# Patient Record
Sex: FEMALE | Race: BLACK OR AFRICAN AMERICAN | Employment: FULL TIME | URBAN - NONMETROPOLITAN AREA
[De-identification: names, ages, dates, MRNs, and addresses within clinical notes are randomized per-mention and may not be internally consistent; named-entity substitution may affect disease eponyms.]

---

## 2020-10-22 ENCOUNTER — HOSPITAL ENCOUNTER (EMERGENCY)
Age: 45
Discharge: HOME | End: 2020-10-22
Attending: EMERGENCY MEDICINE
Payer: COMMERCIAL

## 2020-10-22 VITALS
TEMPERATURE: 98.4 F | SYSTOLIC BLOOD PRESSURE: 170 MMHG | WEIGHT: 293 LBS | DIASTOLIC BLOOD PRESSURE: 103 MMHG | HEIGHT: 63 IN | BODY MASS INDEX: 51.91 KG/M2 | RESPIRATION RATE: 20 BRPM | HEART RATE: 78 BPM | OXYGEN SATURATION: 99 %

## 2020-10-22 DIAGNOSIS — M75.51 ACUTE BURSITIS OF RIGHT SHOULDER: Primary | ICD-10-CM

## 2020-10-22 PROCEDURE — 74011250636 HC RX REV CODE- 250/636: Performed by: EMERGENCY MEDICINE

## 2020-10-22 PROCEDURE — 99283 EMERGENCY DEPT VISIT LOW MDM: CPT

## 2020-10-22 PROCEDURE — 96372 THER/PROPH/DIAG INJ SC/IM: CPT

## 2020-10-22 PROCEDURE — 74011250637 HC RX REV CODE- 250/637: Performed by: EMERGENCY MEDICINE

## 2020-10-22 RX ORDER — IBUPROFEN 800 MG/1
800 TABLET ORAL
Qty: 20 TAB | Refills: 0 | Status: SHIPPED | OUTPATIENT
Start: 2020-10-22 | End: 2020-10-29

## 2020-10-22 RX ORDER — ACETAMINOPHEN AND CODEINE PHOSPHATE 300; 30 MG/1; MG/1
1 TABLET ORAL
Status: COMPLETED | OUTPATIENT
Start: 2020-10-22 | End: 2020-10-22

## 2020-10-22 RX ORDER — KETOROLAC TROMETHAMINE 30 MG/ML
60 INJECTION, SOLUTION INTRAMUSCULAR; INTRAVENOUS
Status: COMPLETED | OUTPATIENT
Start: 2020-10-22 | End: 2020-10-22

## 2020-10-22 RX ORDER — ACETAMINOPHEN AND CODEINE PHOSPHATE 300; 30 MG/1; MG/1
1 TABLET ORAL
Qty: 18 TAB | Refills: 0 | Status: SHIPPED | OUTPATIENT
Start: 2020-10-22 | End: 2020-10-25

## 2020-10-22 RX ADMIN — ACETAMINOPHEN AND CODEINE PHOSPHATE 1 TABLET: 300; 30 TABLET ORAL at 01:42

## 2020-10-22 RX ADMIN — KETOROLAC TROMETHAMINE 60 MG: 30 INJECTION, SOLUTION INTRAMUSCULAR at 01:42

## 2020-10-22 NOTE — ED PROVIDER NOTES
Patient presents with complaint of right shoulder pain for 1 month. Worse with movement or palpation. No recent trauma. Duration: 1 month    Intensity: severe    Modified by: palpation or movement               No past medical history on file. No past surgical history on file. No family history on file. Social History     Socioeconomic History    Marital status: Not on file     Spouse name: Not on file    Number of children: Not on file    Years of education: Not on file    Highest education level: Not on file   Occupational History    Not on file   Social Needs    Financial resource strain: Not on file    Food insecurity     Worry: Not on file     Inability: Not on file    Transportation needs     Medical: Not on file     Non-medical: Not on file   Tobacco Use    Smoking status: Not on file   Substance and Sexual Activity    Alcohol use: Not on file    Drug use: Not on file    Sexual activity: Not on file   Lifestyle    Physical activity     Days per week: Not on file     Minutes per session: Not on file    Stress: Not on file   Relationships    Social connections     Talks on phone: Not on file     Gets together: Not on file     Attends L[employer]-day service: Not on file     Active member of club or organization: Not on file     Attends meetings of clubs or organizations: Not on file     Relationship status: Not on file    Intimate partner violence     Fear of current or ex partner: Not on file     Emotionally abused: Not on file     Physically abused: Not on file     Forced sexual activity: Not on file   Other Topics Concern    Not on file   Social History Narrative    Not on file         ALLERGIES: Patient has no allergy information on record. Review of Systems   Constitutional: Negative. HENT: Negative. Eyes: Negative. Respiratory: Negative. Cardiovascular: Negative. Gastrointestinal: Negative. Endocrine: Negative. Genitourinary: Negative.     Musculoskeletal: + right shoulder pain   Skin: Negative. Allergic/Immunologic: Negative. Neurological: Negative. Hematological: Negative. Psychiatric/Behavioral: Negative. All other systems reviewed and are negative. There were no vitals filed for this visit. Physical Exam  Vitals signs and nursing note reviewed. Constitutional:       Appearance: She is well-developed. She is obese. HENT:      Head: Normocephalic and atraumatic. Nose: Nose normal.      Mouth/Throat:      Mouth: Mucous membranes are moist.   Eyes:      Conjunctiva/sclera: Conjunctivae normal.      Pupils: Pupils are equal, round, and reactive to light. Neck:      Musculoskeletal: Normal range of motion and neck supple. Cardiovascular:      Rate and Rhythm: Normal rate and regular rhythm. Heart sounds: Normal heart sounds. Pulmonary:      Breath sounds: Normal breath sounds. Abdominal:      General: Bowel sounds are normal.      Palpations: Abdomen is soft. Musculoskeletal:      Right shoulder: She exhibits decreased range of motion and tenderness. She exhibits no swelling and no effusion. Arms:    Neurological:      General: No focal deficit present. Mental Status: She is alert.    Psychiatric:         Mood and Affect: Mood normal.         Behavior: Behavior normal.          MDM  Number of Diagnoses or Management Options  Risk of Complications, Morbidity, and/or Mortality  Presenting problems: low  Diagnostic procedures: low    Patient Progress  Patient progress: improved         Procedures

## 2020-11-04 ENCOUNTER — OFFICE VISIT (OUTPATIENT)
Dept: ORTHOPEDIC SURGERY | Age: 45
End: 2020-11-04
Payer: COMMERCIAL

## 2020-11-04 VITALS — BODY MASS INDEX: 51.91 KG/M2 | WEIGHT: 293 LBS | HEIGHT: 63 IN

## 2020-11-04 DIAGNOSIS — M75.51 BURSITIS OF RIGHT SHOULDER: ICD-10-CM

## 2020-11-04 DIAGNOSIS — M25.511 RIGHT SHOULDER PAIN, UNSPECIFIED CHRONICITY: Primary | ICD-10-CM

## 2020-11-04 DIAGNOSIS — E66.01 OBESITY, MORBID (HCC): ICD-10-CM

## 2020-11-04 PROCEDURE — 99203 OFFICE O/P NEW LOW 30 MIN: CPT | Performed by: ORTHOPAEDIC SURGERY

## 2020-11-04 PROCEDURE — 20611 DRAIN/INJ JOINT/BURSA W/US: CPT | Performed by: ORTHOPAEDIC SURGERY

## 2020-11-04 RX ORDER — AMLODIPINE BESYLATE 10 MG/1
TABLET ORAL DAILY
COMMUNITY

## 2020-11-04 RX ORDER — TRIAMCINOLONE ACETONIDE 40 MG/ML
40 INJECTION, SUSPENSION INTRA-ARTICULAR; INTRAMUSCULAR ONCE
Status: COMPLETED | OUTPATIENT
Start: 2020-11-04 | End: 2020-11-04

## 2020-11-04 RX ORDER — LIDOCAINE HYDROCHLORIDE 10 MG/ML
9 INJECTION INFILTRATION; PERINEURAL ONCE
Status: COMPLETED | OUTPATIENT
Start: 2020-11-04 | End: 2020-11-04

## 2020-11-04 RX ADMIN — TRIAMCINOLONE ACETONIDE 40 MG: 40 INJECTION, SUSPENSION INTRA-ARTICULAR; INTRAMUSCULAR at 16:32

## 2020-11-04 RX ADMIN — LIDOCAINE HYDROCHLORIDE 9 ML: 10 INJECTION INFILTRATION; PERINEURAL at 16:32

## 2020-11-04 NOTE — PROGRESS NOTES
Name: Angie Farrell    : 1975     Service Dept: [address] [place] [address] and Sports Medicine    Patient's Pharmacies:    [address] [facility], [address] [facility]  [address] [address]  Phone: [phone] Fax: [phone]       Chief Complaint   Patient presents with    Shoulder Pain        Visit Vitals  Ht 5' 3\" (1.6 m)   Wt 323 lb (146.5 kg)   BMI 57.22 kg/m²        Allergies   Allergen Reactions    Bactrim [Sulfamethoprim] Hives    Penicillins Hives    Sulfa (Sulfonamide Antibiotics) Hives    Zithromax [Azithromycin] Hives        Current Outpatient Medications   Medication Sig Dispense Refill    amLODIPine (NORVASC) 10 mg tablet Take  by mouth daily.  aspirin-acetaminophen-caffeine (EXCEDRIN ES) 250-250-65 mg per tablet Take 1 Tab by mouth. Current Facility-Administered Medications   Medication Dose Route Frequency Provider Last Rate Last Dose    lidocaine (XYLOCAINE) 10 mg/mL (1 %) injection 9 mL  9 mL Other ONCE Chico Parks MD        triamcinolone acetonide (KENALOG-40) 40 mg/mL injection 40 mg  40 mg Intra artICUlar ONCE Chico Parks MD            There is no problem list on file for this patient.        Family History   Problem Relation Age of Onset   Comitia[employer]Prophet Cancer Mother     Hypertension Mother     Diabetes Mother     No Known Problems Brother     Cancer Maternal Aunt     Lupus Maternal Grandmother     Arthritis-osteo Maternal Grandmother     Cancer Maternal Grandmother     Diabetes Maternal Grandfather     Hypertension Maternal Grandfather         Social History     Socioeconomic History    Marital status:      Spouse name: Not on file    Number of children: Not on file    Years of education: Not on file    Highest education level: Not on file   Tobacco Use    Smoking status: Never Smoker    Smokeless tobacco: Never Used   Substance and Sexual Activity    Alcohol use: Yes     Comment: Social drinking 6 days a month  Drug use: Never    Sexual activity: Yes        Past Surgical History:   Procedure Laterality Date    ENDOMETRIAL CRYOABLATION Bilateral 2014    HX CHOLECYSTECTOMY      HX GYN          Past Medical History:   Diagnosis Date    Hypertension     Migraine         I have reviewed and agree with [address] and ROS and intake form in chart and the record. Review of Systems:   Patient is a pleasant appearing individual, appropriately dressed, well hydrated, well nourished, who is alert, appropriately oriented for age, and in no acute distress with a normal gait and normal affect who does not appear to be in any significant pain. Physical Exam:  Right Shoulder - Grossly neurovascularly intact. Range of motion-Full passive, Active with impingement. No Point tenderness, Strength-weakness with abduction, some mild crepitation, No skin lesion are identified, No instabilty is noted, No apprehension. No Swelling. Left Shoulder - Grossly neurovascularly intact, Full Range of motion, No point tenderness, No weakness, No skin lesions, No Instability, No apprehension, No swelling. Procedure Documentation:    Please note that [employer] ultrasound was used to perform an ultrasound guided injection into the right shoulder. A pre-injection ultrasound was taken of right shoulder. After the needle was placed into the Posterior portal(s) and while the kenelog was injected another ultrasound picture confirmed the appropriate placement. The site of injection, right shoulder, was sterilely prepped. The injection of 40 mg Kenalog and Lidocaine was administered appropriately in right shoulder and the patient tolerated it well. No site reaction was identified. Appropriate dressing was placed. Consent was obtained for the injection. Encounter Diagnoses     ICD-10-CM ICD-9-CM   1. Right shoulder pain, unspecified chronicity  M25.511 719.41   2.  Bursitis of right shoulder  M75.51 726.10          HPI:  The patient is here with a chief complaint of right shoulder pain. She has been having it for a while now, progressively getting worse. Throbbing pain. It has been the same. Tylenol 3 has helped. Movement makes it worse. Pain is 6/10. ROS:  10-point review of systems is positive for nighttime pain. X-rays of the right shoulder are unremarkable done in the [facility] office. Assessment/Plan:  1. Right shoulder bursitis. Plan will be for cortisone injection. We will see the patient back in 1 week and continue antiinflammatories in the meantime and go from there. Return to Office: Follow-up and Dispositions    · Return in about 1 week (around 11/11/2020). Scribed by Freedom Sullivan as dictated by Nadya Perez. Ct Arauz MD.    Documentation True and Accepted Chico Arauz MD

## 2020-11-04 NOTE — LETTER
Angie Farrell 1975  
[id number]  
 
 
11/4/2020 I hereby authorize and direct Chico Sinclair MD, Zoran Pack, and whomever he may designate as his associate to perform upon myself the following procedure: 
 
Injection of: Kenalog, Supartz, Euflexxa, Orthovisc in the Right/Left ____________________. If any unforeseen condition arises in the course of the procedure, I further authorize him and his associated and/or assistant(s) to do whatever he/she deems advisable. The nature, purpose, benefits, risks, side effects, likelihood of achieving goals, and potential problems that might occur during recuperation, risks for not receiving the proposed care, treatment and services and alternatives of the procedure have been fully explained to me by my physician including, but not limited to: 
 
Swelling, joint pain, skin pigment changes, worsening of condition, and failure to improve. I acknowledge that no guarantee or assurance has been made to me as to the results that may be obtained or the likelihood of success. _______________________________________ Signature of patient or authorized representative [employer] and Sports Medicine fax: [phone]

## 2020-11-04 NOTE — PATIENT INSTRUCTIONS
Shoulder Pain: Care Instructions  Your Care Instructions     You can hurt your shoulder by using it too much during an activity, such as fishing or baseball. It can also happen as part of the everyday wear and tear of getting older. Shoulder injuries can be slow to heal, but your shoulder should get better with time. Your doctor may recommend a sling to rest your shoulder. If you have injured your shoulder, you may need testing and treatment. Follow-up care is a key part of your treatment and safety. Be sure to make and go to all appointments, and call your doctor if you are having problems. It's also a good idea to know your test results and keep a list of the medicines you take. How can you care for yourself at home? · Take pain medicines exactly as directed. ? If the doctor gave you a prescription medicine for pain, take it as prescribed. ? If you are not taking a prescription pain medicine, ask your doctor if you can take an over-the-counter medicine. ? Do not take two or more pain medicines at the same time unless the doctor told you to. Many pain medicines contain acetaminophen, which is Tylenol. Too much acetaminophen (Tylenol) can be harmful. · If your doctor recommends that you wear a sling, use it as directed. Do not take it off before your doctor tells you to. · Put ice or a cold pack on the sore area for 10 to 20 minutes at a time. Put a thin cloth between the ice and your skin. · If there is no swelling, you can put moist heat, a heating pad, or a warm cloth on your shoulder. Some doctors suggest alternating between hot and cold. · Rest your shoulder for a few days. If your doctor recommends it, you can then begin gentle exercise of the shoulder, but do not lift anything heavy. When should you call for help? Call 911 anytime you think you may need emergency care. For example, call if:    · You have chest pain or pressure. This may occur with:  ? Sweating. ?  Shortness of breath. ? Nausea or vomiting. ? Pain that spreads from the chest to the neck, jaw, or one or both shoulders or arms. ? Dizziness or lightheadedness. ? A fast or uneven pulse. After calling 911, chew 1 adult-strength aspirin. Wait for an ambulance. Do not try to drive yourself.     · Your arm or hand is cool or pale or changes color. Call your doctor now or seek immediate medical care if:    · You have signs of infection, such as:  ? Increased pain, swelling, warmth, or redness in your shoulder. ? Red streaks leading from a place on your shoulder. ? Pus draining from an area of your shoulder. ? Swollen lymph nodes in your neck, armpits, or groin. ? A fever. Watch closely for changes in your health, and be sure to contact your doctor if:    · You cannot use your shoulder.     · Your shoulder does not get better as expected. Where can you learn more? Go to http://www.israel.com/  Enter H996 in the search box to learn more about \"Shoulder Pain: Care Instructions. \"  Current as of: March 2, 2020               Content Version: 12.6  © 8330-0120 [employer]. Care instructions adapted under license by [employer] (which disclaims liability or warranty for this information). If you have questions about a medical condition or this instruction, always ask your healthcare professional. [place] [address] any warranty or liability for your use of this information.

## 2020-11-05 PROBLEM — E66.01 OBESITY, MORBID (HCC): Status: ACTIVE | Noted: 2020-11-05

## 2020-11-11 ENCOUNTER — OFFICE VISIT (OUTPATIENT)
Dept: ORTHOPEDIC SURGERY | Age: 45
End: 2020-11-11
Payer: COMMERCIAL

## 2020-11-11 VITALS — WEIGHT: 293 LBS | BODY MASS INDEX: 51.91 KG/M2 | HEIGHT: 63 IN

## 2020-11-11 DIAGNOSIS — M17.11 PRIMARY OSTEOARTHRITIS OF RIGHT KNEE: Primary | ICD-10-CM

## 2020-11-11 DIAGNOSIS — M25.511 RIGHT SHOULDER PAIN, UNSPECIFIED CHRONICITY: ICD-10-CM

## 2020-11-11 PROCEDURE — 99214 OFFICE O/P EST MOD 30 MIN: CPT | Performed by: ORTHOPAEDIC SURGERY

## 2020-11-11 RX ORDER — VALSARTAN AND HYDROCHLOROTHIAZIDE 160; 12.5 MG/1; MG/1
TABLET, FILM COATED ORAL
COMMUNITY
Start: 2020-11-03 | End: 2022-02-19

## 2020-11-11 NOTE — PATIENT INSTRUCTIONS
Shoulder Pain: Care Instructions Your Care Instructions You can hurt your shoulder by using it too much during an activity, such as fishing or baseball. It can also happen as part of the everyday wear and tear of getting older. Shoulder injuries can be slow to heal, but your shoulder should get better with time. Your doctor may recommend a sling to rest your shoulder. If you have injured your shoulder, you may need testing and treatment. Follow-up care is a key part of your treatment and safety. Be sure to make and go to all appointments, and call your doctor if you are having problems. It's also a good idea to know your test results and keep a list of the medicines you take. How can you care for yourself at home? · Take pain medicines exactly as directed. ? If the doctor gave you a prescription medicine for pain, take it as prescribed. ? If you are not taking a prescription pain medicine, ask your doctor if you can take an over-the-counter medicine. ? Do not take two or more pain medicines at the same time unless the doctor told you to. Many pain medicines contain acetaminophen, which is Tylenol. Too much acetaminophen (Tylenol) can be harmful. · If your doctor recommends that you wear a sling, use it as directed. Do not take it off before your doctor tells you to. · Put ice or a cold pack on the sore area for 10 to 20 minutes at a time. Put a thin cloth between the ice and your skin. · If there is no swelling, you can put moist heat, a heating pad, or a warm cloth on your shoulder. Some doctors suggest alternating between hot and cold. · Rest your shoulder for a few days. If your doctor recommends it, you can then begin gentle exercise of the shoulder, but do not lift anything heavy. When should you call for help? Call 911 anytime you think you may need emergency care. For example, call if: 
  · You have chest pain or pressure. This may occur with: ? Sweating. ? Shortness of breath. ? Nausea or vomiting. ? Pain that spreads from the chest to the neck, jaw, or one or both shoulders or arms. ? Dizziness or lightheadedness. ? A fast or uneven pulse. After calling 911, chew 1 adult-strength aspirin. Wait for an ambulance. Do not try to drive yourself.  
  · Your arm or hand is cool or pale or changes color. Call your doctor now or seek immediate medical care if: 
  · You have signs of infection, such as: 
? Increased pain, swelling, warmth, or redness in your shoulder. ? Red streaks leading from a place on your shoulder. ? Pus draining from an area of your shoulder. ? Swollen lymph nodes in your neck, armpits, or groin. ? A fever. Watch closely for changes in your health, and be sure to contact your doctor if: 
  · You cannot use your shoulder.  
  · Your shoulder does not get better as expected. Where can you learn more? Go to http://www.israel.com/ Enter U708 in the search box to learn more about \"Shoulder Pain: Care Instructions. \" Current as of: March 2, 2020               Content Version: 12.6 © 8547-4343 [employer]. Care instructions adapted under license by [employer] (which disclaims liability or warranty for this information). If you have questions about a medical condition or this instruction, always ask your healthcare professional. [place] [address] any warranty or liability for your use of this information.

## 2020-11-11 NOTE — PROGRESS NOTES
Name: Isauro Ballard    : 1975     Service Dept: [address] [place] [address] and Sports Medicine    Patient's Pharmacies:    [address] [place] [address] [facility], [address] [address]  Phone: [phone] Fax: [phone]       Chief Complaint   Patient presents with    Shoulder Pain        Visit Vitals  Ht 5' 3\" (1.6 m)   Wt 323 lb (146.5 kg)   BMI 57.22 kg/m²        Allergies   Allergen Reactions    Bactrim [Sulfamethoprim] Hives    Penicillins Hives    Sulfa (Sulfonamide Antibiotics) Hives    Zithromax [Azithromycin] Hives        Current Outpatient Medications   Medication Sig Dispense Refill    valsartan-hydroCHLOROthiazide (DIOVAN-HCT) 160-12.5 mg per tablet TAKE 1 TABLET BY MOUTH ONCE DAILY      amLODIPine (NORVASC) 10 mg tablet Take  by mouth daily.  aspirin-acetaminophen-caffeine (EXCEDRIN ES) 250-250-65 mg per tablet Take 1 Tab by mouth.           Patient Active Problem List   Diagnosis Code    Obesity, morbid ([facility] [address] E66.01        Family History   Problem Relation Age of Onset    Cancer Mother     Hypertension Mother     Diabetes Mother     No Known Problems Brother     Cancer Maternal Aunt     Lupus Maternal Grandmother     Arthritis-osteo Maternal Grandmother     Cancer Maternal Grandmother     Diabetes Maternal Grandfather     Hypertension Maternal Grandfather         Social History     Socioeconomic History    Marital status:      Spouse name: Not on file    Number of children: Not on file    Years of education: Not on file    Highest education level: Not on file   Tobacco Use    Smoking status: Never Smoker    Smokeless tobacco: Never Used   Substance and Sexual Activity    Alcohol use: Yes     Comment: Social drinking 6 days a month    Drug use: Never    Sexual activity: Yes        Past Surgical History:   Procedure Laterality Date    ENDOMETRIAL CRYOABLATION Bilateral 2014    HX CHOLECYSTECTOMY      HX GYN Past Medical History:   Diagnosis Date    Hypertension     Migraine         I have reviewed and agree with [address] [place] [address] Nw and ROS and intake form in chart and the record. Review of Systems:   Patient is a pleasant appearing individual, appropriately dressed, well hydrated, well nourished, who is alert, appropriately oriented for age, and in no acute distress with a normal gait and normal affect who does not appear to be in any significant pain. Physical Exam:  Right Shoulder - Positive \"empty can\" test, Grossly neurovascularly intact. Range of motion-Full passive, Active with impingement. No Point tenderness, Strength-significant weakness noted with abduction, some mild crepitation, No skin lesion are identified, No instabilty is noted, No apprehension. No Swelling. Left Shoulder - grossly neurovascularly intact. Full Range of motion, No Weakness with abduction, No Point Tenderness, No skin lesion are identified, No instabilty is noted, No apprehension. No cuts or abrasions are identified. Encounter Diagnoses     ICD-10-CM ICD-9-CM   1. Primary osteoarthritis of right knee  M17.11 715.16   2. Right shoulder pain, unspecified chronicity  M25.511 719.41          HPI:  The patient is here with a chief complaint of right shoulder pain, sharp, throbbing pain, post cortisone injection. About 2 months ago, we gave her cortisone injection. It is still the same. Antiinflammatories have not helped either. Using it makes it worse. Pain is 7/10. ROS:  10-point review of systems is unremarkable. Assessment/Plan:  1. Right shoulder possible rotator cuff pathology with difficulty raising her arm. I would like to go and get an MRI to rule out rotator cuff tear and go from there. Return to Office: Follow-up and Dispositions    · Return for POST MRI. Scribed by Mary Junior as dictated by Adi Hernandez. Alexis Snyder MD.    Documentation True and Accepted Chico Snyder, MD

## 2020-11-17 ENCOUNTER — HOSPITAL ENCOUNTER (OUTPATIENT)
Dept: MRI IMAGING | Age: 45
Discharge: HOME | End: 2020-11-17
Attending: ORTHOPAEDIC SURGERY
Payer: COMMERCIAL

## 2020-11-17 DIAGNOSIS — M25.511 RIGHT SHOULDER PAIN, UNSPECIFIED CHRONICITY: ICD-10-CM

## 2020-11-17 PROCEDURE — 73221 MRI JOINT UPR EXTREM W/O DYE: CPT

## 2020-12-02 ENCOUNTER — OFFICE VISIT (OUTPATIENT)
Dept: ORTHOPEDIC SURGERY | Age: 45
End: 2020-12-02
Payer: COMMERCIAL

## 2020-12-02 DIAGNOSIS — M75.111 INCOMPLETE TEAR OF RIGHT ROTATOR CUFF, UNSPECIFIED WHETHER TRAUMATIC: Primary | ICD-10-CM

## 2020-12-02 PROCEDURE — 99213 OFFICE O/P EST LOW 20 MIN: CPT | Performed by: ORTHOPAEDIC SURGERY

## 2020-12-02 NOTE — PATIENT INSTRUCTIONS
Shoulder Pain: Care Instructions  Your Care Instructions     You can hurt your shoulder by using it too much during an activity, such as fishing or baseball. It can also happen as part of the everyday wear and tear of getting older. Shoulder injuries can be slow to heal, but your shoulder should get better with time. Your doctor may recommend a sling to rest your shoulder. If you have injured your shoulder, you may need testing and treatment. Follow-up care is a key part of your treatment and safety. Be sure to make and go to all appointments, and call your doctor if you are having problems. It's also a good idea to know your test results and keep a list of the medicines you take. How can you care for yourself at home? · Take pain medicines exactly as directed. ? If the doctor gave you a prescription medicine for pain, take it as prescribed. ? If you are not taking a prescription pain medicine, ask your doctor if you can take an over-the-counter medicine. ? Do not take two or more pain medicines at the same time unless the doctor told you to. Many pain medicines contain acetaminophen, which is Tylenol. Too much acetaminophen (Tylenol) can be harmful. · If your doctor recommends that you wear a sling, use it as directed. Do not take it off before your doctor tells you to. · Put ice or a cold pack on the sore area for 10 to 20 minutes at a time. Put a thin cloth between the ice and your skin. · If there is no swelling, you can put moist heat, a heating pad, or a warm cloth on your shoulder. Some doctors suggest alternating between hot and cold. · Rest your shoulder for a few days. If your doctor recommends it, you can then begin gentle exercise of the shoulder, but do not lift anything heavy. When should you call for help? Call 911 anytime you think you may need emergency care. For example, call if:    · You have chest pain or pressure. This may occur with:  ? Sweating. ?  Shortness of breath. ? Nausea or vomiting. ? Pain that spreads from the chest to the neck, jaw, or one or both shoulders or arms. ? Dizziness or lightheadedness. ? A fast or uneven pulse. After calling 911, chew 1 adult-strength aspirin. Wait for an ambulance. Do not try to drive yourself.     · Your arm or hand is cool or pale or changes color. Call your doctor now or seek immediate medical care if:    · You have signs of infection, such as:  ? Increased pain, swelling, warmth, or redness in your shoulder. ? Red streaks leading from a place on your shoulder. ? Pus draining from an area of your shoulder. ? Swollen lymph nodes in your neck, armpits, or groin. ? A fever. Watch closely for changes in your health, and be sure to contact your doctor if:    · You cannot use your shoulder.     · Your shoulder does not get better as expected. Where can you learn more? Go to http://www.israel.com/  Enter H996 in the search box to learn more about \"Shoulder Pain: Care Instructions. \"  Current as of: March 2, 2020               Content Version: 12.6  © 2316-6310 [employer]. Care instructions adapted under license by [employer] (which disclaims liability or warranty for this information). If you have questions about a medical condition or this instruction, always ask your healthcare professional. [place] [address] any warranty or liability for your use of this information.

## 2020-12-02 NOTE — PROGRESS NOTES
Name: Dawood Sifuentes    : 1975     Service Dept: [address] [place] [address] and Sports Medicine    Patient's Pharmacies:    [address] [address] [facility], [address] [address] [place] [id number]  Phone: [phone] Fax: [phone]       Chief Complaint   Patient presents with    Shoulder Pain        There were no vitals taken for this visit. Allergies   Allergen Reactions    Bactrim [Sulfamethoprim] Hives    Penicillins Hives    Sulfa (Sulfonamide Antibiotics) Hives    Zithromax [Azithromycin] Hives        Current Outpatient Medications   Medication Sig Dispense Refill    valsartan-hydroCHLOROthiazide (DIOVAN-HCT) 160-12.5 mg per tablet TAKE 1 TABLET BY MOUTH ONCE DAILY      amLODIPine (NORVASC) 10 mg tablet Take  by mouth daily.  aspirin-acetaminophen-caffeine (EXCEDRIN ES) 250-250-65 mg per tablet Take 1 Tab by mouth.           Patient Active Problem List   Diagnosis Code    Ob[facility], morbid ([facility] E66.01        Family History   Problem Relation Age of Onset    Cancer Mother     Hypertension Mother     Diabetes Mother     No Known Problems Brother     Cancer Maternal Aunt     Lupus Maternal Grandmother     Arthritis-osteo Maternal Grandmother     Cancer Maternal Grandmother     Diabetes Maternal Grandfather     Hypertension Maternal Grandfather         Social History     Socioeconomic History    Marital status:      Spouse name: Not on file    Number of children: Not on file    Years of education: Not on file    Highest education level: Not on file   Tobacco Use    Smoking status: Never Smoker    Smokeless tobacco: Never Used   Substance and Sexual Activity    Alcohol use: Yes     Comment: Social drinking 6 days a month    Drug use: Never    Sexual activity: Yes        Past Surgical History:   Procedure Laterality Date    ENDOMETRIAL CRYOABLATION Bilateral 2014    HX CHOLECYSTECTOMY      HX GYN          Past Medical History: Diagnosis Date    Hypertension     Migraine         I have reviewed and agree with PFSH and ROS and intake form in chart and the record. Review of Systems:   Patient is a pleasant appearing individual, appropriately dressed, well hydrated, well nourished, who is alert, appropriately oriented for age, and in no acute distress with a normal gait and normal affect who does not appear to be in any significant pain. Physical Exam:  Right Shoulder - Grossly neurovascularly intact. Range of motion-Full passive, Active with impingement. No Point tenderness, Strength-weakness with abduction, some mild crepitation, No skin lesion are identified, No instabilty is noted, No apprehension. No Swelling. Left Shoulder - Grossly neurovascularly intact, Full Range of motion, No point tenderness, No weakness, No skin lesions, No Instability, No apprehension, No swelling. Encounter Diagnoses     ICD-10-CM ICD-9-CM   1. Incomplete tear of right rotator cuff, unspecified whether traumatic  M75.111 840.4       HPI:  The patient is here with a chief complaint of right shoulder pain, post-MRI which shows she has got a partial rotator cuff tear. Pain is 6/10. ROS:  10-point review of systems is positive for nighttime pain. Assessment/Plan:  1. Right shoulder rotator cuff partial tear that is getting better, not completely resolved. I do not recommend any surgical intervention. Plan would be for physical therapy. We will see her back in 4 to 6 weeks, and she has also already tried cortisone injection with some minor relief. Return to Office: Follow-up and Dispositions    · Return in about 6 weeks (around 1/13/2021). Scribed by Jaimee Spence as dictated by Christopher Wallace. Jay Mcnair MD.  Documentation True and Accepted Chico Mcnair MD

## 2021-07-27 ENCOUNTER — HOSPITAL ENCOUNTER (EMERGENCY)
Age: 46
Discharge: HOME | End: 2021-07-27
Attending: EMERGENCY MEDICINE
Payer: COMMERCIAL

## 2021-07-27 VITALS
OXYGEN SATURATION: 98 % | WEIGHT: 293 LBS | TEMPERATURE: 98 F | DIASTOLIC BLOOD PRESSURE: 80 MMHG | SYSTOLIC BLOOD PRESSURE: 141 MMHG | HEART RATE: 93 BPM | HEIGHT: 63 IN | BODY MASS INDEX: 51.91 KG/M2 | RESPIRATION RATE: 20 BRPM

## 2021-07-27 DIAGNOSIS — T14.8XXA MUSCLE STRAIN: Primary | ICD-10-CM

## 2021-07-27 DIAGNOSIS — M54.6 ACUTE RIGHT-SIDED THORACIC BACK PAIN: ICD-10-CM

## 2021-07-27 PROCEDURE — 99283 EMERGENCY DEPT VISIT LOW MDM: CPT

## 2021-07-27 PROCEDURE — 74011250637 HC RX REV CODE- 250/637: Performed by: EMERGENCY MEDICINE

## 2021-07-27 RX ORDER — CYCLOBENZAPRINE HCL 10 MG
10 TABLET ORAL
Qty: 20 TABLET | Refills: 0 | Status: SHIPPED | OUTPATIENT
Start: 2021-07-27 | End: 2022-02-19

## 2021-07-27 RX ORDER — IBUPROFEN 800 MG/1
800 TABLET ORAL
Qty: 20 TABLET | Refills: 0 | Status: SHIPPED | OUTPATIENT
Start: 2021-07-27 | End: 2021-08-03

## 2021-07-27 RX ORDER — IBUPROFEN 800 MG/1
800 TABLET ORAL ONCE
Status: COMPLETED | OUTPATIENT
Start: 2021-07-27 | End: 2021-07-27

## 2021-07-27 RX ADMIN — IBUPROFEN 800 MG: 800 TABLET, FILM COATED ORAL at 19:41

## 2021-07-27 NOTE — ED PROVIDER NOTES
EMERGENCY DEPARTMENT HISTORY AND PHYSICAL EXAM      Date: 7/27/2021  Patient Name: Adelaide Moser    History of Presenting Illness     Chief Complaint   Patient presents with    Back Pain     pt presents with generalized back pain states that it has been ongoing for 3 days. Pt states she strained her back at work. Pt ambulatory to triage, pt rates pain 8/10. Pt stats that she has taken OTC medications w/o relief       History Provided By: Patient    HPI: Adelaide Moser, 39 y.o. female with a past medical history significant hypertension presents to the ED with cc of right thoracic back pain injured 3 days ago gradually worsening. No shortness of breath falls previous history of back pain    There are no other complaints, changes, or physical findings at this time. PCP: Chevy Gonzales MD    No current facility-administered medications on file prior to encounter. Current Outpatient Medications on File Prior to Encounter   Medication Sig Dispense Refill    oxyCODONE-acetaminophen (PERCOCET) 5-325 mg per tablet TAKE 1 2 TO 1 (ONE HALF TO ONE) TABLET BY MOUTH EVERY 6 HOURS AS NEEDED FOR SEVERE PAIN      valsartan-hydroCHLOROthiazide (DIOVAN-HCT) 160-12.5 mg per tablet TAKE 1 TABLET BY MOUTH ONCE DAILY      amLODIPine (NORVASC) 10 mg tablet Take  by mouth daily.  aspirin-acetaminophen-caffeine (EXCEDRIN ES) 250-250-65 mg per tablet Take 1 Tab by mouth.          Past History     Past Medical History:  Past Medical History:   Diagnosis Date    Hypertension     Migraine        Past Surgical History:  Past Surgical History:   Procedure Laterality Date    ENDOMETRIAL CRYOABLATION Bilateral 2014    HX CHOLECYSTECTOMY      HX GYN         Family History:  Family History   Problem Relation Age of Onset    Cancer Mother     Hypertension Mother     Diabetes Mother     No Known Problems Brother     Cancer Maternal Aunt     Lupus Maternal Grandmother     Arthritis-osteo Maternal Grandmother     Cancer Maternal Grandmother     Diabetes Maternal Grandfather     Hypertension Maternal Grandfather        Social History:  Social History     Tobacco Use    Smoking status: Never Smoker    Smokeless tobacco: Never Used   Substance Use Topics    Alcohol use: Yes     Comment: Social drinking 6 days a month    Drug use: Never       Allergies: Allergies   Allergen Reactions    Bactrim [Sulfamethoprim] Hives    Penicillins Hives    Sulfa (Sulfonamide Antibiotics) Hives    Zithromax [Azithromycin] Hives         Review of Systems   Review of all other systems negative  Review of Systems    Physical Exam   Is a black female in moderate pain  Physical Exam  Vitals and nursing note reviewed. Constitutional:       General: She is not in acute distress. Appearance: Normal appearance. She is obese. She is not ill-appearing, toxic-appearing or diaphoretic. HENT:      Head: Normocephalic and atraumatic. Nose: Nose normal.      Mouth/Throat:      Mouth: Mucous membranes are moist.   Eyes:      Extraocular Movements: Extraocular movements intact. Conjunctiva/sclera: Conjunctivae normal.      Pupils: Pupils are equal, round, and reactive to light. Cardiovascular:      Rate and Rhythm: Normal rate and regular rhythm. Pulses: Normal pulses. Heart sounds: Normal heart sounds. No murmur heard. Pulmonary:      Effort: Pulmonary effort is normal. No respiratory distress. Breath sounds: Normal breath sounds. No wheezing, rhonchi or rales. Chest:      Chest wall: No tenderness. Abdominal:      General: Abdomen is flat. Palpations: Abdomen is soft. There is no mass. Tenderness: There is no abdominal tenderness. There is no right CVA tenderness, left CVA tenderness, guarding or rebound. Hernia: No hernia is present. Musculoskeletal:         General: Tenderness present. No deformity or signs of injury.         Arms:       Cervical back: Normal range of motion and neck supple. No rigidity. No muscular tenderness. Right lower leg: No edema. Left lower leg: No edema. Comments: Marked tenderness in the right lateral thoracic back worse with other extremity movement   Skin:     General: Skin is warm. Findings: No erythema or rash. Neurological:      General: No focal deficit present. Mental Status: She is alert and oriented to person, place, and time. Cranial Nerves: No cranial nerve deficit. Sensory: No sensory deficit. Motor: No weakness. Psychiatric:         Mood and Affect: Mood normal.         Behavior: Behavior normal.         Thought Content: Thought content normal.         Lab and Diagnostic Study Results     Labs -   No results found for this or any previous visit (from the past 12 hour(s)). Radiologic Studies -   @lastxrresult@  CT Results  (Last 48 hours)    None        CXR Results  (Last 48 hours)    None            Medical Decision Making   - I am the first provider for this patient. - I reviewed the vital signs, available nursing notes, past medical history, past surgical history, family history and social history. - Initial assessment performed. The patients presenting problems have been discussed, and they are in agreement with the care plan formulated and outlined with them. I have encouraged them to ask questions as they arise throughout their visit. Vital Signs-Reviewed the patient's vital signs.   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 Diagnosis, care plan and treatment options were discussed. The patient understands the instructions and will follow up as directed. The patients results have been reviewed with them. They have been counseled regarding their diagnosis. The patient verbally convey understanding and agreement of the signs, symptoms, diagnosis, treatment and prognosis and additionally agrees to follow up as recommended with their PCP in 24 - 48 hours. They also agree with the care-plan and convey that all of their questions have been answered. I have also put together some discharge instructions for them that include: 1) educational information regarding their diagnosis, 2) how to care for their diagnosis at home, as well a 3) list of reasons why they would want to return to the ED prior to their follow-up appointment, should their condition change. DISCHARGE PLAN:  1. Current Discharge Medication List      CONTINUE these medications which have NOT CHANGED    Details   oxyCODONE-acetaminophen (PERCOCET) 5-325 mg per tablet TAKE 1 2 TO 1 (ONE HALF TO ONE) TABLET BY MOUTH EVERY 6 HOURS AS NEEDED FOR SEVERE PAIN      valsartan-hydroCHLOROthiazide (DIOVAN-HCT) 160-12.5 mg per tablet TAKE 1 TABLET BY MOUTH ONCE DAILY      amLODIPine (NORVASC) 10 mg tablet Take  by mouth daily. aspirin-acetaminophen-caffeine (EXCEDRIN ES) 250-250-65 mg per tablet Take 1 Tab by mouth. 2.   Follow-up Information    None       3. Return to ED if worse   4.    Current Discharge Medication List            Diagnosis     Clinical Impression: Thoracic muscle strain  Attestations:    Marya Zimmer MD    Please note that this dictation was completed with Style[employer], the computer voice recognition software. Quite often unanticipated grammatical, syntax, homophones, and other interpretive errors are inadvertently transcribed by the computer software. Please disregard these errors. Please excuse any errors that have escaped final proofreading. Thank you.

## 2021-07-27 NOTE — LETTER
NOTIFICATION RETURN TO WORK / SCHOOL    7/27/2021 7:34 PM    Ms. Jamse Koyanagi  [place] [address] [place] [id number]      To Whom It May Concern:    Jamse Koyanagi is currently under the care of [facility] EMERGENCY DEPT. She will return to work/school on: July 30, 2021    Jennifer Velazquez may return to work/school with the following restrictions: Limited bending and lifting as tolerated back injury. If there are questions or concerns please have the patient contact our office.         Sincerely,      Marya Zimmer MD

## 2021-07-27 NOTE — ED TRIAGE NOTES
.  Chief Complaint   Patient presents with    Back Pain     pt presents with generalized back pain states that it has been ongoing for 3 days. Pt states she strained her back at work. Pt ambulatory to triage, pt rates pain 8/10.  Pt stats that she has taken OTC medications w/o relief

## 2021-09-02 ENCOUNTER — HOSPITAL ENCOUNTER (EMERGENCY)
Age: 46
Discharge: HOME | End: 2021-09-02
Attending: EMERGENCY MEDICINE
Payer: COMMERCIAL

## 2021-09-02 VITALS
SYSTOLIC BLOOD PRESSURE: 141 MMHG | BODY MASS INDEX: 51.91 KG/M2 | HEIGHT: 63 IN | OXYGEN SATURATION: 98 % | RESPIRATION RATE: 20 BRPM | WEIGHT: 293 LBS | DIASTOLIC BLOOD PRESSURE: 94 MMHG | TEMPERATURE: 98.4 F | HEART RATE: 88 BPM

## 2021-09-02 DIAGNOSIS — S39.012A BACK STRAIN, INITIAL ENCOUNTER: Primary | ICD-10-CM

## 2021-09-02 PROCEDURE — 74011250637 HC RX REV CODE- 250/637: Performed by: EMERGENCY MEDICINE

## 2021-09-02 PROCEDURE — 99283 EMERGENCY DEPT VISIT LOW MDM: CPT

## 2021-09-02 RX ORDER — NAPROXEN 375 MG/1
375 TABLET ORAL
Status: COMPLETED | OUTPATIENT
Start: 2021-09-02 | End: 2021-09-02

## 2021-09-02 RX ORDER — METHOCARBAMOL 750 MG/1
750 TABLET, FILM COATED ORAL 4 TIMES DAILY
Qty: 20 TABLET | Refills: 0 | Status: SHIPPED | OUTPATIENT
Start: 2021-09-02 | End: 2021-09-07

## 2021-09-02 RX ORDER — NAPROXEN 500 MG/1
500 TABLET ORAL 2 TIMES DAILY WITH MEALS
Qty: 10 TABLET | Refills: 0 | Status: SHIPPED | OUTPATIENT
Start: 2021-09-02 | End: 2021-09-07

## 2021-09-02 RX ORDER — METHOCARBAMOL 500 MG/1
750 TABLET, FILM COATED ORAL
Status: COMPLETED | OUTPATIENT
Start: 2021-09-02 | End: 2021-09-02

## 2021-09-02 RX ORDER — NAPROXEN 375 MG/1
375 TABLET ORAL 2 TIMES DAILY WITH MEALS
Status: DISCONTINUED | OUTPATIENT
Start: 2021-09-03 | End: 2021-09-02

## 2021-09-02 RX ADMIN — NAPROXEN 375 MG: 375 TABLET ORAL at 20:55

## 2021-09-02 RX ADMIN — METHOCARBAMOL 750 MG: 500 TABLET ORAL at 20:47
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
## 2022-02-19 ENCOUNTER — HOSPITAL ENCOUNTER (EMERGENCY)
Age: 47
Discharge: HOME | End: 2022-02-19
Attending: EMERGENCY MEDICINE
Payer: COMMERCIAL

## 2022-02-19 VITALS
OXYGEN SATURATION: 98 % | HEIGHT: 63 IN | DIASTOLIC BLOOD PRESSURE: 102 MMHG | RESPIRATION RATE: 20 BRPM | HEART RATE: 73 BPM | WEIGHT: 293 LBS | TEMPERATURE: 98 F | BODY MASS INDEX: 51.91 KG/M2 | SYSTOLIC BLOOD PRESSURE: 146 MMHG

## 2022-02-19 DIAGNOSIS — M79.602 LEFT ARM PAIN: Primary | ICD-10-CM

## 2022-02-19 PROCEDURE — 99282 EMERGENCY DEPT VISIT SF MDM: CPT

## 2022-02-19 NOTE — Clinical Note
[address]  [facility] EMERGENCY DEPT  [place] [address] [id number]  [phone]    Work/School Note    Date: 2/19/2022    To Whom It May concern:    Sony Amos was seen and treated today in the emergency room by the following provider(s):  Attending Provider: Solomon Green MD.      Sony Amos is excused from work/school on 2/19/2022 through 2/21/2022. She is medically clear to return to work/school on 2/22/2022.          Sincerely,          Enrique Mohamud MD

## 2022-02-19 NOTE — ED TRIAGE NOTES
Patient states her arm has been hurting since January, but has gotten worse over the time; states she is starting to experience intermittent numbness and tingling in her lower arm and hands; and a feeling of swollness in her fingers

## 2022-02-19 NOTE — ED PROVIDER NOTES
EMERGENCY DEPARTMENT HISTORY AND PHYSICAL EXAM      Date: 2/19/2022  Patient Name: Trenton Schmitt    History of Presenting Illness     No chief complaint on file. History Provided By: Patient    HPI: Trenton Schmitt, 42 y.o. female with a past medical history significant hypertension presents to the ED with cc of left arm pain for over a month. Patient works doing repetitive movements. Right arm had similar changes and patient was seen by Dr. Evelia Mishra no numbness or tingling sensation. Recent trauma    There are no other complaints, changes, or physical findings at this time. PCP: Jacqueline Denson MD    No current facility-administered medications on file prior to encounter. Current Outpatient Medications on File Prior to Encounter   Medication Sig Dispense Refill    cyclobenzaprine (FLEXERIL) 10 mg tablet Take 1 Tablet by mouth three (3) times daily as needed for Muscle Spasm(s). 20 Tablet 0    oxyCODONE-acetaminophen (PERCOCET) 5-325 mg per tablet TAKE 1 2 TO 1 (ONE HALF TO ONE) TABLET BY MOUTH EVERY 6 HOURS AS NEEDED FOR SEVERE PAIN      valsartan-hydroCHLOROthiazide (DIOVAN-HCT) 160-12.5 mg per tablet TAKE 1 TABLET BY MOUTH ONCE DAILY      amLODIPine (NORVASC) 10 mg tablet Take  by mouth daily.  aspirin-acetaminophen-caffeine (EXCEDRIN ES) 250-250-65 mg per tablet Take 1 Tab by mouth.          Past History     Past Medical History:  Past Medical History:   Diagnosis Date    Hypertension     Migraine        Past Surgical History:  Past Surgical History:   Procedure Laterality Date    ENDOMETRIAL CRYOABLATION Bilateral 2014    HX CHOLECYSTECTOMY      HX GYN         Family History:  Family History   Problem Relation Age of Onset   [employer] Cancer Mother     Hypertension Mother     Diabetes Mother     No Known Problems Brother     Cancer Maternal Aunt     Lupus Maternal Grandmother     OSTEOARTHRITIS Maternal Grandmother     Cancer Maternal Grandmother     Diabetes Maternal Grandfather     Hypertension Maternal Grandfather        Social History:  Social History     Tobacco Use    Smoking status: Never Smoker    Smokeless tobacco: Never Used   Substance Use Topics    Alcohol use: Yes     Comment: Social drinking 6 days a month    Drug use: Never       Allergies: Allergies   Allergen Reactions    Bactrim [Sulfamethoprim] Hives    Penicillins Hives    Sulfa (Sulfonamide Antibiotics) Hives    Zithromax [Azithromycin] Hives         Review of Systems     Review of Systems   Constitutional: Negative. HENT: Negative. Eyes: Negative. Respiratory: Negative. Cardiovascular: Negative. Gastrointestinal: Negative. Endocrine: Negative. Genitourinary: Negative. Musculoskeletal: Negative. Left arm pain swelling   Skin: Negative. Allergic/Immunologic: Negative. Neurological: Negative. Hematological: Negative. Psychiatric/Behavioral: Negative. Physical Exam     Physical Exam  Vitals and nursing note reviewed. Constitutional:       Appearance: Normal appearance. HENT:      Head: Normocephalic. Right Ear: Tympanic membrane normal.      Left Ear: Tympanic membrane normal.      Nose: Nose normal.      Mouth/Throat:      Mouth: Mucous membranes are moist.   Eyes:      Pupils: Pupils are equal, round, and reactive to light. Cardiovascular:      Rate and Rhythm: Normal rate. Pulses: Normal pulses. Pulmonary:      Effort: Pulmonary effort is normal.   Abdominal:      General: Abdomen is flat. Palpations: Abdomen is soft. Musculoskeletal:         General: Tenderness present. No swelling, deformity or signs of injury. Normal range of motion. Cervical back: Normal range of motion and neck supple. Right lower leg: No edema. Left lower leg: No edema. Skin:     Capillary Refill: Capillary refill takes less than 2 seconds. Neurological:      General: No focal deficit present. Mental Status: She is alert. Psychiatric:         Mood and Affect: Mood normal.         Lab and Diagnostic Study Results     Labs -   No results found for this or any previous visit (from the past 12 hour(s)). Radiologic Studies -   @lastxrresult@  CT Results  (Last 48 hours)    None        CXR Results  (Last 48 hours)    None            Medical Decision Making   - I am the first provider for this patient. - I reviewed the vital signs, available nursing notes, past medical history, past surgical history, family history and social history. - Initial assessment performed. The patients presenting problems have been discussed, and they are in agreement with the care plan formulated and outlined with them. I have encouraged them to ask questions as they arise throughout their visit. Vital Signs-Reviewed the patient's vital signs. No data found. Records Reviewed: Nursing Notes    The patient presents with left arm pain with a differential diagnosis of neuropathy, radiculopathy, ulnar or radial nerve syndrome,, trauma,    ED Course:          Provider Notes (Medical Decision Making): MDM       Procedures   Medical Decision Makingedical Decision Making  Performed by: Juliette Thurman MD  PROCEDURES:Procedures       Disposition   Disposition: Condition stable  DC- Adult Discharges: All of the diagnostic tests were reviewed and questions answered. Diagnosis, care plan and treatment options were discussed. The patient understands the instructions and will follow up as directed. The patients results have been reviewed with them. They have been counseled regarding their diagnosis. The patient and clergy verbally convey understanding and agreement of the signs, symptoms, diagnosis, treatment and prognosis and additionally agrees to follow up as recommended with their PCP in 24 - 48 hours. They also agree with the care-plan and convey that all of their questions have been answered.   I have also put together some discharge instructions for them that include: 1) educational information regarding their diagnosis, 2) how to care for their diagnosis at home, as well a 3) list of reasons why they would want to return to the ED prior to their follow-up appointment, should their condition change. DISCHARGE PLAN:  1. Current Discharge Medication List      CONTINUE these medications which have NOT CHANGED    Details   cyclobenzaprine (FLEXERIL) 10 mg tablet Take 1 Tablet by mouth three (3) times daily as needed for Muscle Spasm(s). Qty: 20 Tablet, Refills: 0      oxyCODONE-acetaminophen (PERCOCET) 5-325 mg per tablet TAKE 1 2 TO 1 (ONE HALF TO ONE) TABLET BY MOUTH EVERY 6 HOURS AS NEEDED FOR SEVERE PAIN      valsartan-hydroCHLOROthiazide (DIOVAN-HCT) 160-12.5 mg per tablet TAKE 1 TABLET BY MOUTH ONCE DAILY      amLODIPine (NORVASC) 10 mg tablet Take  by mouth daily. aspirin-acetaminophen-caffeine (EXCEDRIN ES) 250-250-65 mg per tablet Take 1 Tab by mouth. 2.   Follow-up Information    None       3. Return to ED if worse   4. Current Discharge Medication List            Diagnosis     Clinical Impression:     ICD-10-CM ICD-9-CM    1. Left arm pain  M79.602 729.5     Rule out radiculopathy     Attestations:    Kim Pineda MD    Please note that this dictation was completed with [employer], the computer voice recognition software. Quite often unanticipated grammatical, syntax, homophones, and other interpretive errors are inadvertently transcribed by the computer software. Please disregard these errors. Please excuse any errors that have escaped final proofreading. Thank you.

## 2022-02-19 NOTE — Clinical Note
[address] [facility] EMERGENCY DEPT  [address] [phone]  [phone]    Work/School Note    Date: 2/19/2022    To Whom It May concern:    Halley Lopez was seen and treated today in the emergency room by the following provider(s):  Attending Provider: Jeremy Garcia MD.      Halley Lopez is excused from work/school on 2/19/2022 through 2/21/2022. She is medically clear to return to work/school on 2/22/2022.          Sincerely,          Brodie Fragoso

## 2022-02-19 NOTE — DISCHARGE INSTRUCTIONS
Motrin or Tylenol as directed for pain. Dr. Danay Delacruz office on Monday for an appointment next week for further evaluation and treatment.

## 2022-03-07 DIAGNOSIS — M25.512 LEFT SHOULDER PAIN, UNSPECIFIED CHRONICITY: Primary | ICD-10-CM

## 2022-03-20 PROBLEM — E66.01 OBESITY, MORBID (HCC): Status: ACTIVE | Noted: 2020-11-05

## 2022-03-23 ENCOUNTER — OFFICE VISIT (OUTPATIENT)
Dept: ORTHOPEDIC SURGERY | Age: 47
End: 2022-03-23
Payer: COMMERCIAL

## 2022-03-23 DIAGNOSIS — M25.512 LEFT SHOULDER PAIN, UNSPECIFIED CHRONICITY: Primary | ICD-10-CM

## 2022-03-23 DIAGNOSIS — M75.52 BURSITIS OF LEFT SHOULDER: ICD-10-CM

## 2022-03-23 PROCEDURE — 99213 OFFICE O/P EST LOW 20 MIN: CPT | Performed by: ORTHOPAEDIC SURGERY

## 2022-03-23 PROCEDURE — 20611 DRAIN/INJ JOINT/BURSA W/US: CPT | Performed by: ORTHOPAEDIC SURGERY

## 2022-03-23 RX ORDER — TRIAMCINOLONE ACETONIDE 40 MG/ML
40 INJECTION, SUSPENSION INTRA-ARTICULAR; INTRAMUSCULAR ONCE
Status: COMPLETED | OUTPATIENT
Start: 2022-03-23 | End: 2022-03-23

## 2022-03-23 RX ORDER — LIDOCAINE HYDROCHLORIDE 10 MG/ML
9 INJECTION INFILTRATION; PERINEURAL ONCE
Status: COMPLETED | OUTPATIENT
Start: 2022-03-23 | End: 2022-03-23

## 2022-03-23 RX ADMIN — TRIAMCINOLONE ACETONIDE 40 MG: 40 INJECTION, SUSPENSION INTRA-ARTICULAR; INTRAMUSCULAR at 16:00

## 2022-03-23 RX ADMIN — LIDOCAINE HYDROCHLORIDE 9 ML: 10 INJECTION INFILTRATION; PERINEURAL at 16:00

## 2022-03-23 NOTE — PROGRESS NOTES
Name: Leonie Rodriguze    : 1975     Service Dept: [address] [place] [address] and Sports Medicine    Chief Complaint   Patient presents with    Arm Pain        There were no vitals taken for this visit. Allergies   Allergen Reactions    Bactrim [Sulfamethoprim] Hives    Penicillins Hives    Sulfa (Sulfonamide Antibiotics) Hives    Zithromax [Azithromycin] Hives        Current Outpatient Medications   Medication Sig Dispense Refill    amLODIPine (NORVASC) 10 mg tablet Take  by mouth daily.  aspirin-acetaminophen-caffeine (EXCEDRIN ES) 250-250-65 mg per tablet Take 1 Tab by mouth.        Current Facility-Administered Medications   Medication Dose Route Frequency Provider Last Rate Last Admin    [COMPLETED] lidocaine (XYLOCAINE) 10 mg/mL (1 %) injection 9 mL  9 mL Other ONCE Chico Parks MD   9 mL at 22 1600    [COMPLETED] triamcinolone acetonide (KENALOG-40) 40 mg/mL injection 40 mg  40 mg Intra artICUlar ONCE Marsha RENAE MD   40 mg at 22 1600      Patient Active Problem List   Diagnosis Code    Obesity, morbid ([facility] [address] E66.01      Family History   Problem Relation Age of Onset    Cancer Mother     Hypertension Mother     Diabetes Mother     No Known Problems Brother     Cancer Maternal Aunt     Lupus Maternal Grandmother     OSTEOARTHRITIS Maternal Grandmother     Cancer Maternal Grandmother     Diabetes Maternal Grandfather     Hypertension Maternal Grandfather       Social History     Socioeconomic History    Marital status:    Tobacco Use    Smoking status: Never Smoker    Smokeless tobacco: Never Used   Substance and Sexual Activity    Alcohol use: Yes     Comment: Social drinking 6 days a month    Drug use: Never    Sexual activity: Yes      Past Surgical History:   Procedure Laterality Date    ENDOMETRIAL CRYOABLATION Bilateral     HX CHOLECYSTECTOMY      HX GYN        Past Medical History:   Diagnosis Date    Hypertension     Migraine         I have reviewed and agree with [address] [place] [address] Nw and ROS and intake form in chart and the record furthermore I have reviewed prior medical record(s) regarding this patients care during this appointment. Review of Systems:   Patient is a pleasant appearing individual, appropriately dressed, well hydrated, well nourished, who is alert, appropriately oriented for age, and in no acute distress with a normal gait and normal affect who does not appear to be in any significant pain. Physical Exam:  Left Shoulder - Grossly neurovascularly intact. Range of motion-Full passive, Active with impingement. No Point tenderness, Strength-weakness with abduction, some mild crepitation, No skin lesion are identified, No instabilty is noted, No apprehension. No Swelling. Right Shoulder - Grossly neurovascularly intact, Full Range of motion, No point tenderness, No weakness, No skin lesions, No Instability, No apprehension, No swelling. Procedure Documentation:    I discussed in detail the risks, benefits and complications of an injection which included but are not limited to infection, skin reactions, hot swollen joint, and anaphylaxis with the patient. The patient verbalized understanding and gave informed consent for the injection. The patient's left shoulder were prepped using sterile alcohol solution. A sterile needle was inserted into the left shoulder and the mixture of 9 mL Lidocaine 1%, 1 mL Kenalog 40 mg was injected under sterile technique. The needle was withdrawn and the puncture site sealed with a Band-Aid. Technique: Under sterile conditions a alaTest ultrasound unit with a variable frequency (7.0-14.0 MHz) linear transducer was used to localize the placement of needle into the left joint. Findings: Successful needle placement for shoulder injection. Final images were taken and saved for permanent record. The patient tolerated the injection well.  The patient was instructed to call the office immediately if there is any pain, redness, warmth, fever, or chills. Encounter Diagnoses     ICD-10-CM ICD-9-CM   1. Left shoulder pain, unspecified chronicity  M25.512 719.41   2. Bursitis of left shoulder  M75.52 726.10       HPI:  The patient is here with a chief complaint of left shoulder pain. Throbbing, burning pain. Pain is 7/10. X-rays of the left shoulder are unremarkable. Assessment/Plan:  1. Left shoulder bursitis. Plan will be for a cortisone injection. If it helps it is all we need to do and we will go from there. As part of continued conservative pain management options the patient was advised to utilize Tylenol or OTC NSAIDS as long as it is not medically contraindicated. Return to Office: Follow-up and Dispositions    · Return in about 3 weeks (around 4/13/2022). Administrations This Visit     lidocaine (XYLOCAINE) 10 mg/mL (1 %) injection 9 mL     Admin Date  03/23/2022 Action  Given Dose  9 mL Route  Other Administered By  Lori Lema LPN          triamcinolone acetonide (KENALOG-40) 40 mg/mL injection 40 mg     Admin Date  03/23/2022 Action  Given Dose  40 mg Route  Intra artICUlar Administered By  Lori Lema LPN               Scribed by Starla Burkitt, LPN as dictated by [facility] JOMAR Lucero MD.  Documentation True and Accepted Chico Lucero MD

## 2022-03-23 NOTE — LETTER
Tracy Jackson   1975   [id number]       3/23/2022       I hereby authorize and direct Chico Camarena MD, [address] [place] [address], and whomever he may designate as his associate to perform upon myself the following procedure:    Injection of: Kenalog, LT SHOULDER RM 1    If any unforeseen condition arises in the course of the procedure, I further authorize him and his associated and/or assistant(s) to do whatever he/she deems advisable. The nature, purpose, benefits, risks, side effects, likelihood of achieving goals, and potential problems that might occur during recuperation, risks for not receiving the proposed care, treatment and services and alternatives of the procedure have been fully explained to me by my physician including, but not limited to:    Swelling, joint pain, skin pigment changes, worsening of condition, and failure to improve. I acknowledge that no guarantee or assurance has been made to me as to the results that may be obtained or the likelihood of success.                 _______________________________________     Signature of patient or authorized representative                [employer] and Sports Medicine fax: [phone]

## 2022-03-23 NOTE — LETTER
3/24/2022    Patient: Tootie Mcmahan   YOB: 1975   Date of Visit: 3/23/2022     Nnamdi Quispe MD  [facility] [address] [id number]  Via Fax: [phone]    Dear Nnamdi Quispe MD,      Thank you for referring Ms. Madhu Donato to [address] [place] [address] for evaluation. My notes for this consultation are attached. If you have questions, please do not hesitate to call me. I look forward to following your patient along with you.       Sincerely,    Ame Irizarry MD

## 2022-03-23 NOTE — PATIENT INSTRUCTIONS
Shoulder Bursitis: Care Instructions  Your Care Instructions     Bursitis is inflammation of the bursa. A bursa is a small sac of fluid that cushions a joint and helps it move easily. A bursa sits under the highest point of your shoulder. You can get bursitis by overusing your shoulder, which can happen with activities such as lifting, pitching a ball, or painting. Symptoms of bursitis include pain when you move your arm. Your arm may hurt when you try to lift it, and the pain can reach down the side of your arm. You may have trouble sleeping because of the pain. Bursitis usually gets better if you avoid the activity that caused it. If pain lasts or gets worse despite home treatment, your doctor may draw fluid from the bursa through a needle. This may relieve your pain and help your doctor know if you have an infection. If so, your doctor will prescribe antibiotics. If you have inflammation only, you may get a corticosteroid shot to reduce swelling and pain. Sometimes surgery is needed to drain or remove the bursa. Follow-up care is a key part of your treatment and safety. Be sure to make and go to all appointments, and call your doctor if you are having problems. It's also a good idea to know your test results and keep a list of the medicines you take. How can you care for yourself at home? · Put ice or a cold pack on your shoulder for 10 to 20 minutes at a time. Put a thin cloth between the ice and your skin. · After 3 days of using ice, use heat on your shoulder. You can use a hot water bottle, a heating pad set on low, or a warm, moist towel. Some doctors suggest alternating between hot and cold. · Rest your shoulder. Stop any activities that cause pain. Switch to activities that do not stress your shoulder. · Take your medicines exactly as prescribed. Call your doctor if you think you are having a problem with your medicine.   · If your doctor recommends it, take anti-inflammatory medicines to reduce pain. These include ibuprofen (Advil, Motrin) and naproxen (Aleve). Read and follow all instructions on the label. · To prevent stiffness, gently move the shoulder joint through its full range of motion. As the pain gets better, keep doing range-of-motion exercises. Ask your doctor for exercises that will make the muscles around the shoulder joint stronger. Do these as directed. · You can slowly return to the activity that caused the pain, but do it with less effort until you can do it without pain or swelling. Be sure to warm up before and stretch after you do the activity. When should you call for help? Call your doctor now or seek immediate medical care if:    · You have a fever.     · You have increased swelling or redness in your shoulder.     · You cannot use your shoulder, or the pain in your shoulder gets worse. Watch closely for changes in your health, and be sure to contact your doctor if:    · You have pain for 2 weeks or longer despite home treatment. Where can you learn more? Go to http://www.gray.com/  Enter M955 in the search box to learn more about \"Shoulder Bursitis: Care Instructions. \"  Current as of: July 1, 2021               Content Version: 13.2  © 8651-1066 [employer]. Care instructions adapted under license by [employer] (which disclaims liability or warranty for this information). If you have questions about a medical condition or this instruction, always ask your healthcare professional. [place] [address] any warranty or liability for your use of this information.

## 2022-07-18 ENCOUNTER — HOSPITAL ENCOUNTER (EMERGENCY)
Age: 47
Discharge: HOME | End: 2022-07-18
Attending: EMERGENCY MEDICINE
Payer: COMMERCIAL

## 2022-07-18 VITALS
OXYGEN SATURATION: 99 % | RESPIRATION RATE: 16 BRPM | BODY MASS INDEX: 51.91 KG/M2 | TEMPERATURE: 97.7 F | DIASTOLIC BLOOD PRESSURE: 110 MMHG | HEART RATE: 97 BPM | WEIGHT: 293 LBS | HEIGHT: 63 IN | SYSTOLIC BLOOD PRESSURE: 161 MMHG

## 2022-07-18 DIAGNOSIS — M70.51 PATELLAR BURSITIS OF BOTH KNEES: Primary | ICD-10-CM

## 2022-07-18 DIAGNOSIS — M70.52 PATELLAR BURSITIS OF BOTH KNEES: Primary | ICD-10-CM

## 2022-07-18 PROCEDURE — 99283 EMERGENCY DEPT VISIT LOW MDM: CPT

## 2022-07-18 PROCEDURE — 74011250637 HC RX REV CODE- 250/637: Performed by: EMERGENCY MEDICINE

## 2022-07-18 RX ORDER — IBUPROFEN 800 MG/1
800 TABLET ORAL
Qty: 20 TABLET | Refills: 0 | Status: SHIPPED | OUTPATIENT
Start: 2022-07-18 | End: 2022-07-25

## 2022-07-18 RX ORDER — IBUPROFEN 800 MG/1
800 TABLET ORAL ONCE
Status: COMPLETED | OUTPATIENT
Start: 2022-07-18 | End: 2022-07-18

## 2022-07-18 RX ADMIN — IBUPROFEN 800 MG: 800 TABLET, FILM COATED ORAL at 16:40

## 2022-07-18 NOTE — Clinical Note
[address]  [facility] EMERGENCY DEPT  [address] [phone]  [phone]    Work/School Note    Date: 7/18/2022    To Whom It May concern:    Umu Pascual was seen and treated today in the emergency room by the following provider(s):  Attending Provider: Alistair Freeman MD.      Umu Pascual is excused from work/school on 07/18/22 and 07/19/22. She is medically clear to return to work/school on 7/20/2022.        Sincerely,          Sherrian Paget, MD

## 2022-07-18 NOTE — ED PROVIDER NOTES
HPI 59-year-old female with hypertension presents the ED complaining of bilateral knee pain right greater than left in the anterior patellar region bilaterally ongoing for 3 to 4 weeks and worsening. No injury recent or remote. Previously with bursitis of right shoulder requiring steroid injection    Past Medical History:   Diagnosis Date    Hypertension     Migraine        Past Surgical History:   Procedure Laterality Date    ENDOMETRIAL CRYOABLATION Bilateral 2014    HX CHOLECYSTECTOMY      HX GYN           Family History:   Problem Relation Age of Onset   [facility] Cancer Mother     Hypertension Mother     Diabetes Mother     No Known Problems Brother     Cancer Maternal Aunt     Lupus Maternal Grandmother     OSTEOARTHRITIS Maternal Grandmother     Cancer Maternal Grandmother     Diabetes Maternal Grandfather     Hypertension Maternal Grandfather        Social History     Socioeconomic History    Marital status: SINGLE     Spouse name: Not on file    Number of children: Not on file    Years of education: Not on file    Highest education level: Not on file   Occupational History    Not on file   Tobacco Use    Smoking status: Never Smoker    Smokeless tobacco: Never Used   Substance and Sexual Activity    Alcohol use: Yes     Comment: Social drinking 6 days a month    Drug use: Never    Sexual activity: Yes   Other Topics Concern    Not on file   Social History Narrative    Not on file     Social Determinants of Health     Financial Resource Strain:     Difficulty of Paying Living Expenses: Not on file   Food Insecurity:     Worried About Running Out of Food in the Last Year: Not on file    Wang of Food in the Last Year: Not on file   Transportation Needs:     Lack of Transportation (Medical): Not on file    Lack of Transportation (Non-Medical):  Not on file   Physical Activity:     Days of Exercise per Week: Not on file    Minutes of Exercise per Session: Not on file   Stress:     Feeling of Stress : Not on file   Social Connections:     Frequency of Communication with Friends and Family: Not on file    Frequency of Social Gatherings with Friends and Family: Not on file    Attends Mosque Services: Not on file    Active Member of Clubs or Organizations: Not on file    Attends Club or Organization Meetings: Not on file    Marital Status: Not on file   Intimate Partner Violence:     Fear of Current or Ex-Partner: Not on file    Emotionally Abused: Not on file    Physically Abused: Not on file    Sexually Abused: Not on file   Housing Stability:     Unable to Pay for Housing in the Last Year: Not on file    Number of [place] in the Last Year: Not on file    Unstable Housing in the Last Year: Not on file         ALLERGIES: Bactrim [sulfamethoprim], Penicillins, Sulfa (sulfonamide antibiotics), and Zithromax [azithromycin]    Review of Systems   All other systems reviewed and are negative. Vitals:    07/18/22 1617   BP: (!) 161/110   Pulse: 97   Resp: 16   Temp: 97.7 °F (36.5 °C)   SpO2: 99%   Weight: 142.9 kg (315 lb)   Height: 5' 3\" (1.6 m)          Pleasant middle-age AA female no acute distress  Physical Exam  Vitals and nursing note reviewed. Constitutional:       General: She is not in acute distress. Appearance: Normal appearance. She is obese. She is not ill-appearing, toxic-appearing or diaphoretic. HENT:      Head: Normocephalic and atraumatic. Nose: Nose normal.      Mouth/Throat:      Mouth: Mucous membranes are moist.   Eyes:      Extraocular Movements: Extraocular movements intact. Conjunctiva/sclera: Conjunctivae normal.   Cardiovascular:      Rate and Rhythm: Normal rate and regular rhythm. Pulses: Normal pulses. Heart sounds: Normal heart sounds. No murmur heard. Pulmonary:      Effort: Pulmonary effort is normal. No respiratory distress. Breath sounds: Normal breath sounds. No wheezing, rhonchi or rales.    Abdominal: General: Bowel sounds are normal. There is no distension. Palpations: Abdomen is soft. There is no mass. Tenderness: There is no abdominal tenderness. There is no right CVA tenderness, left CVA tenderness, guarding or rebound. Hernia: No hernia is present. Musculoskeletal:         General: Tenderness present. No swelling or signs of injury. Normal range of motion. Cervical back: Normal range of motion and neck supple. No rigidity or tenderness. Right lower leg: No edema. Left lower leg: No edema. Comments: Bilateral patella tender to palpation; extension against resistance increases pain. There is no joint effusion bilaterally. Cruciate and collateral ligament stress show no evidence of laxity or injury. No tenderness in the popliteal region. Lower legs are neurovascularly intact   Lymphadenopathy:      Cervical: No cervical adenopathy. Skin:     General: Skin is warm and dry. Capillary Refill: Capillary refill takes less than 2 seconds. Findings: No erythema, lesion or rash. Neurological:      General: No focal deficit present. Mental Status: She is alert and oriented to person, place, and time. Mental status is at baseline. Cranial Nerves: No cranial nerve deficit. Sensory: No sensory deficit. Psychiatric:         Mood and Affect: Mood normal.         Behavior: Behavior normal.         Thought Content:  Thought content normal.          MDM clinically with pre-patella bursitis bilaterally we will treat with full dose ibuprofen suggest orthopedic follow-up       Procedures

## 2022-07-26 ENCOUNTER — OFFICE VISIT (OUTPATIENT)
Dept: ORTHOPEDIC SURGERY | Age: 47
End: 2022-07-26
Payer: COMMERCIAL

## 2022-07-26 DIAGNOSIS — M17.0 OSTEOARTHRITIS OF BOTH KNEES, UNSPECIFIED OSTEOARTHRITIS TYPE: ICD-10-CM

## 2022-07-26 DIAGNOSIS — M25.562 PAIN IN BOTH KNEES, UNSPECIFIED CHRONICITY: Primary | ICD-10-CM

## 2022-07-26 DIAGNOSIS — M25.561 PAIN IN BOTH KNEES, UNSPECIFIED CHRONICITY: Primary | ICD-10-CM

## 2022-07-26 PROCEDURE — 20611 DRAIN/INJ JOINT/BURSA W/US: CPT | Performed by: ORTHOPAEDIC SURGERY

## 2022-07-26 RX ORDER — LIDOCAINE HYDROCHLORIDE 10 MG/ML
9 INJECTION INFILTRATION; PERINEURAL ONCE
Status: COMPLETED | OUTPATIENT
Start: 2022-07-26 | End: 2022-07-26

## 2022-07-26 RX ORDER — TRIAMCINOLONE ACETONIDE 40 MG/ML
40 INJECTION, SUSPENSION INTRA-ARTICULAR; INTRAMUSCULAR ONCE
Status: COMPLETED | OUTPATIENT
Start: 2022-07-26 | End: 2022-07-26

## 2022-07-26 RX ADMIN — TRIAMCINOLONE ACETONIDE 40 MG: 40 INJECTION, SUSPENSION INTRA-ARTICULAR; INTRAMUSCULAR at 12:00

## 2022-07-26 RX ADMIN — LIDOCAINE HYDROCHLORIDE 9 ML: 10 INJECTION INFILTRATION; PERINEURAL at 12:00

## 2022-07-26 NOTE — PATIENT INSTRUCTIONS
Knee Arthritis: Care Instructions  Your Care Instructions     Knee arthritis is a breakdown of the cartilage that cushions your knee joint. When the cartilage wears down, your bones rub against each other. This causes pain and stiffness. Knee arthritis tends to get worse with time. Treatment for knee arthritis involves reducing pain, making the leg muscles stronger, and staying at a healthy body weight. The treatment usually does not improve the health of the cartilage, but it can reduce pain and improve how well your knee works. You can take simple measures to protect your knee joints, ease your pain, and help you stay active. Follow-up care is a key part of your treatment and safety. Be sure to make and go to all appointments, and call your doctor if you are having problems. It's also a good idea to know your test results and keep a list of the medicines you take. How can you care for yourself at home? Know that knee arthritis will cause more pain on some days than on others. Stay at a healthy weight. Lose weight if you are overweight. When you stand up, the pressure on your knees from every pound of body weight is multiplied four times. So if you lose 10 pounds, you will reduce the pressure on your knees by 40 pounds. Talk to your doctor or physical therapist about exercises that will help ease joint pain. Stretch to help prevent stiffness and to prevent injury before you exercise. You may enjoy gentle forms of yoga to help keep your knee joints and muscles flexible. Walk instead of jog. Ride a bike. This makes your thigh muscles stronger and takes pressure off your knee. Wear well-fitting and comfortable shoes. Exercise in chest-deep water. This can help you exercise longer with less pain. Avoid exercises that include squatting or kneeling. They can put a lot of strain on your knees. Talk to your doctor to make sure that the exercise you do is not making the arthritis worse.   Do not sit for long periods of time. Try to walk once in a while to keep your knee from getting stiff. Ask your doctor or physical therapist whether shoe inserts may reduce your arthritis pain. If you can afford it, get new athletic shoes at least every year. This can help reduce the strain on your knees. Use a device to help you do everyday activities. A cane or walking stick can help you keep your balance when you walk. Hold the cane or walking stick in the hand opposite the painful knee. If you feel like you may fall when you walk, try using crutches or a front-wheeled walker. These can prevent falls that could cause more damage to your knee. A knee brace may help keep your knee stable and prevent pain. You also can use other things to make life easier, such as a higher toilet seat and handrails in the bathtub or shower. Take pain medicines exactly as directed. Do not wait until you are in severe pain. You will get better results if you take it sooner. If you are not taking a prescription pain medicine, take an over-the-counter medicine such as acetaminophen (Tylenol), ibuprofen (Advil, Motrin), or naproxen (Aleve). Read and follow all instructions on the label. Do not take two or more pain medicines at the same time unless the doctor told you to. Many pain medicines have acetaminophen, which is Tylenol. Too much acetaminophen (Tylenol) can be harmful. Tell your doctor if you take a blood thinner, have diabetes, or have allergies to shellfish. Ask your doctor if you might benefit from a shot of steroid medicine into your knee. This may provide pain relief for several months. Many people take the supplements glucosamine and chondroitin for osteoarthritis. Some people feel they help, but the medical research does not show that they work. Talk to your doctor before you take these supplements. When should you call for help?    Call your doctor now or seek immediate medical care if:    You have sudden swelling, warmth, or pain in your knee. You have knee pain and a fever or rash. You have such bad pain that you cannot use your knee. Watch closely for changes in your health, and be sure to contact your doctor if you have any problems. Where can you learn more? Go to http://carolyn-polly.info/  Enter W187 in the search box to learn more about \"Knee Arthritis: Care Instructions. \"  Current as of: December 20, 2021               Content Version: 13.2  © 2006-2022 [employer]. Care instructions adapted under license by [employer] (which disclaims liability or warranty for this information). If you have questions about a medical condition or this instruction, always ask your healthcare professional. [address] any warranty or liability for your use of this information.

## 2022-07-26 NOTE — PROGRESS NOTES
Name: Isabell Valadez    : 1975     Service Dept: [address] [place] [address] and Sports Medicine    Chief Complaint   Patient presents with    Knee Pain        There were no vitals taken for this visit. Allergies   Allergen Reactions    Bactrim [Sulfamethoprim] Hives    Penicillins Hives    Sulfa (Sulfonamide Antibiotics) Hives    Zithromax [Azithromycin] Hives        Current Outpatient Medications   Medication Sig Dispense Refill    amLODIPine (NORVASC) 10 mg tablet Take  by mouth daily. aspirin-acetaminophen-caffeine (EXCEDRIN ES) 250-250-65 mg per tablet Take 1 Tab by mouth.        Current Facility-Administered Medications   Medication Dose Route Frequency Provider Last Rate Last Admin    [COMPLETED] lidocaine (XYLOCAINE) 10 mg/mL (1 %) injection 9 mL  9 mL Other ONCE Chico Parks MD   9 mL at 22 1200    [COMPLETED] triamcinolone acetonide (KENALOG-40) 40 mg/mL injection 40 mg  40 mg Intra artICUlar ONCE Chico Parks MD   40 mg at 22 1200    [COMPLETED] lidocaine (XYLOCAINE) 10 mg/mL (1 %) injection 9 mL  9 mL Other ONCE Chico Parks MD   9 mL at 22 1200    [COMPLETED] triamcinolone acetonide (KENALOG-40) 40 mg/mL injection 40 mg  40 mg Intra artICUlar ONCE Deon RENAE MD   40 mg at 22 1200      Patient Active Problem List   Diagnosis Code    Obesity, morbid ([facility] [address] E66.01      Family History   Problem Relation Age of Onset    Cancer Mother     Hypertension Mother     Diabetes Mother     No Known Problems Brother     Cancer Maternal Aunt     Lupus Maternal Grandmother     OSTEOARTHRITIS Maternal Grandmother     Cancer Maternal Grandmother     Diabetes Maternal Grandfather     Hypertension Maternal Grandfather       Social History     Socioeconomic History    Marital status: SINGLE   Tobacco Use    Smoking status: Never    Smokeless tobacco: Never   Substance and Sexual Activity    Alcohol use: Yes     Comment: Social drinking 6 days a month Drug use: Never    Sexual activity: Yes      Past Surgical History:   Procedure Laterality Date    ENDOMETRIAL CRYOABLATION Bilateral 2014    HX CHOLECYSTECTOMY      HX GYN        Past Medical History:   Diagnosis Date    Hypertension     Migraine         I have reviewed and agree with [address] and ROS and intake form in chart and the record furthermore I have reviewed prior medical record(s) regarding this patients care during this appointment. Review of Systems:   Patient is a pleasant appearing individual, appropriately dressed, well hydrated, well nourished, who is alert, appropriately oriented for age, and in no acute distress with a normal gait and normal affect who does not appear to be in any significant pain. Physical Exam:  Left Knee -Decrease range of motion with flexion, Knee arc of greater than 50 degrees, Some crepitation, Grossly neurovascularly intact, Good cap refill, No skin lesion, Moderate swelling, No gross instability, Some quadriceps weakness, greater than 50 degree arc    Right  Knee -Decrease range of motion with flexion, Knee arc of greater than 50 degrees, Some crepitation, Grossly neurovascularly intact, Good cap refill, No skin lesion, Moderate swelling, No gross instability, Some quadriceps weakness, greater than 50 degree arc     Procedure Documentation:    I discussed in detail the risks, benefits and complications of an injection which included but are not limited to infection, skin reactions, hot swollen joint, and anaphylaxis with the patient. The patient verbalized understanding and gave informed consent for the injection. The patient's knees were flexed to 90° and the skin prepped using sterile alcohol solution. A sterile needle was inserted into the bilateral knee(s) and the mixture of 9 mL Lidocaine 1%, 1 mL Kenalog 40 mg was injected under sterile technique. The needle was withdrawn and the puncture site sealed with a Band-Aid.       Technique: Under sterile conditions a [employer] ultrasound unit with a variable frequency (7.0-14.0 MHz) linear transducer was used to localize the placement of needle into the bilateral knee(s) joint. Findings: Successful needle placement for knee injection. Final images were taken and saved for permanent record. The patient tolerated the injection well. The patient was instructed to call the office immediately if there is any pain, redness, warmth, fever, or chills. Encounter Diagnoses     ICD-10-CM ICD-9-CM   1. Pain in both knees, unspecified chronicity  M25.561 719.46    M25.562    2. Osteoarthritis of both knees, unspecified osteoarthritis type  M17.0 715.96       HPI:  The patient is here with a chief complaint of bilateral knee pain, throbbing, burning pain, progressively getting worse. Pain is 8/10. X-rays of the bilateral knees are positive for OA, mild in nature in the past.    Assessment/Plan:  Plan will be for cortisone injection to both knees, weight loss program, ice, elevate, antiinflammatories, and go from there. As part of continued conservative pain management options the patient was advised to utilize Tylenol or OTC NSAIDS as long as it is not medically contraindicated. Return to Office: Follow-up and Dispositions    Return in about 4 weeks (around 8/23/2022).         Administrations This Visit       lidocaine (XYLOCAINE) 10 mg/mL (1 %) injection 9 mL       Admin Date  07/26/2022 Action  Given Dose  9 mL Route  Other Administered By  Maria Alejandra Carmichael LPN      Admin Date  07/26/2022 Action  Given Dose  9 mL Route  Other Administered By  Maria Alejandra Carmichael LPN              triamcinolone acetonide (KENALOG-40) 40 mg/mL injection 40 mg       Admin Date  07/26/2022 Action  Given Dose  40 mg Route  Intra artICUlar Administered By  Maria Alejandra Carmichael LPN      Admin Date  07/26/2022 Action  Given Dose  40 mg Route  Intra artICUlar Administered By  Maria Alejandra Carmichael LPN                   Scribed by Rubina Brannon LPN as dictated by Lillian Bello. Tahir Boykin MD.  Documentation True and Accepted Chico Boykin MD

## 2022-07-26 NOTE — LETTER
Cathy Bhatt   1975   [id number]       7/26/2022       I hereby authorize and direct Chico Lomeli MD, Miguel Greene, and whomever he may designate as his associate to perform upon myself the following procedure:    Injection of: Kenalog, BL KNEE    If any unforeseen condition arises in the course of the procedure, I further authorize him and his associated and/or assistant(s) to do whatever he/she deems advisable. The nature, purpose, benefits, risks, side effects, likelihood of achieving goals, and potential problems that might occur during recuperation, risks for not receiving the proposed care, treatment and services and alternatives of the procedure have been fully explained to me by my physician including, but not limited to:    Swelling, joint pain, skin pigment changes, worsening of condition, and failure to improve. I acknowledge that no guarantee or assurance has been made to me as to the results that may be obtained or the likelihood of success.                 _______________________________________     Signature of patient or authorized representative                [employer] and Sports Medicine fax: [phone]

## 2022-07-26 NOTE — LETTER
7/27/2022    Patient: Tereso Jones   YOB: 1975   Date of Visit: 7/26/2022     Ildefonso Gold MD  [facility] [address] [id number]  Via Fax: [phone]    Dear Ildefonso Gold MD,      Thank you for referring Ms. Na Johns to [address] [place] [address] for evaluation. My notes for this consultation are attached. If you have questions, please do not hesitate to call me. I look forward to following your patient along with you.       Sincerely,    Lorena Moon MD

## 2022-08-23 ENCOUNTER — OFFICE VISIT (OUTPATIENT)
Dept: ORTHOPEDIC SURGERY | Age: 47
End: 2022-08-23
Payer: COMMERCIAL

## 2022-08-23 VITALS — BODY MASS INDEX: 51.91 KG/M2 | HEIGHT: 63 IN | WEIGHT: 293 LBS

## 2022-08-23 DIAGNOSIS — M17.0 OSTEOARTHRITIS OF BOTH KNEES, UNSPECIFIED OSTEOARTHRITIS TYPE: Primary | ICD-10-CM

## 2022-08-23 PROCEDURE — 99213 OFFICE O/P EST LOW 20 MIN: CPT | Performed by: ORTHOPAEDIC SURGERY

## 2022-08-23 NOTE — PATIENT INSTRUCTIONS
Knee Arthritis: Care Instructions  Your Care Instructions     Knee arthritis is a breakdown of the cartilage that cushions your knee joint. When the cartilage wears down, your bones rub against each other. This causes pain and stiffness. Knee arthritis tends to get worse with time. Treatment for knee arthritis involves reducing pain, making the leg muscles stronger, and staying at a healthy body weight. The treatment usually does not improve the health of the cartilage, but it can reduce pain and improve how well your knee works. You can take simple measures to protect your knee joints, ease your pain, and help you stay active. Follow-up care is a key part of your treatment and safety. Be sure to make and go to all appointments, and call your doctor if you are having problems. It's also a good idea to know your test results and keep a list of the medicines you take. How can you care for yourself at home? Know that knee arthritis will cause more pain on some days than on others. Stay at a healthy weight. Lose weight if you are overweight. When you stand up, the pressure on your knees from every pound of body weight is multiplied four times. So if you lose 10 pounds, you will reduce the pressure on your knees by 40 pounds. Talk to your doctor or physical therapist about exercises that will help ease joint pain. Stretch to help prevent stiffness and to prevent injury before you exercise. You may enjoy gentle forms of yoga to help keep your knee joints and muscles flexible. Walk instead of jog. Ride a bike. This makes your thigh muscles stronger and takes pressure off your knee. Wear well-fitting and comfortable shoes. Exercise in chest-deep water. This can help you exercise longer with less pain. Avoid exercises that include squatting or kneeling. They can put a lot of strain on your knees. Talk to your doctor to make sure that the exercise you do is not making the arthritis worse.   Do not sit for long periods of time. Try to walk once in a while to keep your knee from getting stiff. Ask your doctor or physical therapist whether shoe inserts may reduce your arthritis pain. If you can afford it, get new athletic shoes at least every year. This can help reduce the strain on your knees. Use a device to help you do everyday activities. A cane or walking stick can help you keep your balance when you walk. Hold the cane or walking stick in the hand opposite the painful knee. If you feel like you may fall when you walk, try using crutches or a front-wheeled walker. These can prevent falls that could cause more damage to your knee. A knee brace may help keep your knee stable and prevent pain. You also can use other things to make life easier, such as a higher toilet seat and handrails in the bathtub or shower. Take pain medicines exactly as directed. Do not wait until you are in severe pain. You will get better results if you take it sooner. If you are not taking a prescription pain medicine, take an over-the-counter medicine such as acetaminophen (Tylenol), ibuprofen (Advil, Motrin), or naproxen (Aleve). Read and follow all instructions on the label. Do not take two or more pain medicines at the same time unless the doctor told you to. Many pain medicines have acetaminophen, which is Tylenol. Too much acetaminophen (Tylenol) can be harmful. Tell your doctor if you take a blood thinner, have diabetes, or have allergies to shellfish. Ask your doctor if you might benefit from a shot of steroid medicine into your knee. This may provide pain relief for several months. Many people take the supplements glucosamine and chondroitin for osteoarthritis. Some people feel they help, but the medical research does not show that they work. Talk to your doctor before you take these supplements. When should you call for help?    Call your doctor now or seek immediate medical care if:    You have sudden swelling, warmth, or pain in your knee. You have knee pain and a fever or rash. You have such bad pain that you cannot use your knee. Watch closely for changes in your health, and be sure to contact your doctor if you have any problems. Where can you learn more? Go to http://www.gray.com/  Enter W187 in the search box to learn more about \"Knee Arthritis: Care Instructions. \"  Current as of: December 20, 2021               Content Version: 13.2  © 2006-2022 [employer]. Care instructions adapted under license by [employer] (which disclaims liability or warranty for this information). If you have questions about a medical condition or this instruction, always ask your healthcare professional. [address] any warranty or liability for your use of this information.

## 2022-08-23 NOTE — PROGRESS NOTES
Name: Eulalio Adams    : 1975     Service Dept: [address] [facility]    Chief Complaint   Patient presents with    Knee Pain     B/L        Visit Vitals  Ht 5' 3\" (1.6 m)   Wt 314 lb (142.4 kg)   BMI 55.62 kg/m²        Allergies   Allergen Reactions    Bactrim [Sulfamethoprim] Hives    Penicillins Hives    Sulfa (Sulfonamide Antibiotics) Hives    Zithromax [Azithromycin] Hives        Current Outpatient Medications   Medication Sig Dispense Refill    amLODIPine (NORVASC) 10 mg tablet Take  by mouth daily. aspirin-acetaminophen-caffeine (EXCEDRIN ES) 250-250-65 mg per tablet Take 1 Tab by mouth. Patient Active Problem List   Diagnosis Code    Obesity, morbid ([facility] [address] E66.01      Family History   Problem Relation Age of Onset    Cancer Mother     Hypertension Mother     Diabetes Mother     No Known Problems Brother     Cancer Maternal Aunt     Lupus Maternal Grandmother     OSTEOARTHRITIS Maternal Grandmother     Cancer Maternal Grandmother     Diabetes Maternal Grandfather     Hypertension Maternal Grandfather       Social History     Socioeconomic History    Marital status: SINGLE   Tobacco Use    Smoking status: Never    Smokeless tobacco: Never   Substance and Sexual Activity    Alcohol use: Yes     Comment: Social drinking 6 days a month    Drug use: Never    Sexual activity: Yes      Past Surgical History:   Procedure Laterality Date    ENDOMETRIAL CRYOABLATION Bilateral 2014    HX CHOLECYSTECTOMY      HX GYN        Past Medical History:   Diagnosis Date    Hypertension     Migraine         I have reviewed and agree with [address] and ROS and intake form in chart and the record furthermore I have reviewed prior medical record(s) regarding this patients care during this appointment.      Review of Systems:   Patient is a pleasant appearing individual, appropriately dressed, well hydrated, well nourished, who is alert, appropriately oriented for age, and in no acute distress with a normal gait and normal affect who does not appear to be in any significant pain. Physical Exam:  Left Knee -Decrease range of motion with flexion, Knee arc of greater than 50 degrees, Some crepitation, Grossly neurovascularly intact, Good cap refill, No skin lesion, Moderate swelling, some gross instability, Some quadriceps weakness Kellgren and Wesley at least grade 3    Right Knee -Decrease range of motion with flexion, Some crepitation, Grossly neurovascularly intact, Good cap refill, No skin lesion, Moderate swelling, some gross instability, Some quadriceps weaknessKellgren and Wesley at least grade 3    Encounter Diagnoses     ICD-10-CM ICD-9-CM   1. Osteoarthritis of both knees, unspecified osteoarthritis type  M17.0 715.96       HPI:  The patient is here with a chief complaint of bilateral knee pain, throbbing, burning pain. It has been the same. Pain is 7/10. X-rays are positive for severe OA with morbid obesity. Assessment/Plan:  Plan at this point, activities as tolerated started, no restrictions. We will see the patient back as needed. She needs to work on weight loss program.      As part of continued conservative pain management options the patient was advised to utilize Tylenol or OTC NSAIDS as long as it is not medically contraindicated. Return to Office: Follow-up and Dispositions    Return if symptoms worsen or fail to improve. Scribed by Cassie Iverson LPN as dictated by [facility] JOMAR Cronin MD.  Documentation True and Accepted [place] Cronin MD

## 2022-08-23 NOTE — LETTER
8/24/2022    Patient: Angelina Kemp   YOB: 1975   Date of Visit: 8/23/2022     Hector Sheikh MD  [place] [id number]  Via Fax: [phone]    Dear Hector Sheikh MD,      Thank you for referring Ms. Micah Hinds to [address] [place] [address] for evaluation. My notes for this consultation are attached. If you have questions, please do not hesitate to call me. I look forward to following your patient along with you.       Sincerely,    Rajiv Rodríguez MD

## 2022-09-01 ENCOUNTER — TRANSCRIBE ORDER (OUTPATIENT)
Dept: SCHEDULING | Age: 47
End: 2022-09-01

## 2022-09-01 DIAGNOSIS — Z12.31 VISIT FOR SCREENING MAMMOGRAM: Primary | ICD-10-CM

## 2022-10-05 ENCOUNTER — HOSPITAL ENCOUNTER (OUTPATIENT)
Dept: MAMMOGRAPHY | Age: 47
Discharge: HOME | End: 2022-10-05
Attending: FAMILY MEDICINE
Payer: COMMERCIAL

## 2022-10-05 DIAGNOSIS — Z12.31 VISIT FOR SCREENING MAMMOGRAM: ICD-10-CM

## 2022-10-05 PROCEDURE — 77063 BREAST TOMOSYNTHESIS BI: CPT

## 2023-04-20 ENCOUNTER — PATIENT MESSAGE (OUTPATIENT)
Dept: OBGYN CLINIC | Age: 48
End: 2023-04-20

## 2023-04-21 ENCOUNTER — OFFICE VISIT (OUTPATIENT)
Dept: OBGYN CLINIC | Age: 48
End: 2023-04-21

## 2023-04-21 VITALS
HEIGHT: 63 IN | BODY MASS INDEX: 51.91 KG/M2 | HEART RATE: 98 BPM | DIASTOLIC BLOOD PRESSURE: 93 MMHG | WEIGHT: 293 LBS | SYSTOLIC BLOOD PRESSURE: 140 MMHG

## 2023-04-21 DIAGNOSIS — N92.0 MENORRHAGIA WITH REGULAR CYCLE: ICD-10-CM

## 2023-04-21 DIAGNOSIS — Z01.419 ENCOUNTER FOR WELL WOMAN EXAM WITH ROUTINE GYNECOLOGICAL EXAM: Primary | ICD-10-CM

## 2023-04-21 DIAGNOSIS — N94.6 DYSMENORRHEA: ICD-10-CM

## 2023-04-21 DIAGNOSIS — Z12.4 CERVICAL CANCER SCREENING: ICD-10-CM

## 2023-04-21 DIAGNOSIS — Z11.51 ENCOUNTER FOR SCREENING FOR HUMAN PAPILLOMAVIRUS (HPV): ICD-10-CM

## 2023-04-21 DIAGNOSIS — Z76.89 ENCOUNTER FOR MENSTRUAL REGULATION: ICD-10-CM

## 2023-04-21 RX ORDER — DROSPIRENONE 4 MG/1
1 TABLET, FILM COATED ORAL DAILY
Qty: 90 EACH | Refills: 0 | Status: SHIPPED | OUTPATIENT
Start: 2023-04-21 | End: 2023-07-20

## 2023-04-21 RX ORDER — CHOLECALCIFEROL (VITAMIN D3) 125 MCG
CAPSULE ORAL
COMMUNITY

## 2023-04-21 NOTE — PROGRESS NOTES
[facility]  [email or web address]  [phone]    Zain Weber MD, [facility]     Annual Gynecologic Exam:  Chief Complaint   Patient presents with    Well Woman       Zainab Maxwell is a ,  41 y.o. female   Patient's last menstrual period was 2023 (approximate). She presents for her annual checkup. She is having significant does not want to have a cycle, having more cramping, ho ablation. .      Per Rooming Note:  Patient's last menstrual period was 2023 (approximate). Her periods are  varying  in flow and usually regular with a 26-32 day interval with 3-7 day duration. She has dysmenorrhea. Problems:  in  she had an endometrial ablation, she reports a stronger odor during her cycle since then, pt reports the pads give her vaginal itching. She wants to discuss options to help her stop her cycles but not gain weight. Birth Control: abstinence. Last Pap: over 5 years ago wnl per pt  She does not have a history of ISHA 2, 3 or cervical cancer. Last Mammogram:  last year WNL per pt . Last Bone Density: never obtained  Last colonoscopy: never obtained     Sexual history and Contraception:  Social History     Substance and Sexual Activity   Sexual Activity Not Currently    Partners: Male       Past Medical History:   Diagnosis Date    Hypertension     Migraine      Current Outpatient Medications   Medication Sig    aspirin/acetaminophen/caffeine (EXCEDRIN MIGRAINE PO) Take  by mouth. naproxen sodium (Aleve) 220 mg cap Take  by mouth. drospirenone, contraceptive, (Slynd) 4 mg (28) tab Take 1 Tablet by mouth daily for 90 days. amLODIPine (NORVASC) 10 mg tablet Take  by mouth daily. aspirin-acetaminophen-caffeine (EXCEDRIN ES) 250-250-65 mg per tablet Take 1 Tab by mouth. (Patient not taking: Reported on 2023)     No current facility-administered medications for this visit.      OB History    Para Term  AB Living   2 2 2 2   SAB IAB Ectopic Molar Multiple Live Births             3      # Outcome Date GA Lbr Joe/2nd Weight Sex Delivery Anes PTL Lv   2 Term            1 Term              Past Surgical History:   Procedure Laterality Date    ENDOMETRIAL CRYOABLATION Bilateral 2014    HX CHOLECYSTECTOMY      HX GYN       Family History   Problem Relation Age of Onset    Cancer Mother     Hypertension Mother     Diabetes Mother     No Known Problems Brother     Cancer Maternal Aunt     Lupus Maternal Grandmother     OSTEOARTHRITIS Maternal Grandmother     Cancer Maternal Grandmother     Diabetes Maternal Grandfather     Hypertension Maternal Grandfather      Social History     Socioeconomic History    Marital status: SINGLE     Spouse name: Not on file    Number of children: Not on file    Years of education: Not on file    Highest education level: Not on file   Occupational History    Not on file   Tobacco Use    Smoking status: Some Days     Types: Cigars    Smokeless tobacco: Never   Vaping Use    Vaping Use: Never used   Substance and Sexual Activity    Alcohol use: Yes     Comment: occasional    Drug use: Never    Sexual activity: Not Currently     Partners: Male   Other Topics Concern    Not on file   Social History Narrative    Not on file     Social Determinants of Health     Financial Resource Strain: Not on file   Food Insecurity: Not on file   Transportation Needs: Not on file   Physical Activity: Not on file   Stress: Not on file   Social Connections: Not on file   Intimate Partner Violence: Not on file   Housing Stability: Not on file     Tobacco History:  reports that she has been smoking cigars. She has never used smokeless tobacco.  Alcohol Abuse:  reports current alcohol use. Drug Abuse:  reports no history of drug use.   Allergies   Allergen Reactions    Bactrim [Sulfamethoprim] Hives    Penicillins Hives    Sulfa (Sulfonamide Antibiotics) Hives    Zithromax [Azithromycin] Hives       Patient Active Problem List Diagnosis Code    Obesity, morbid ([facility] E66.01       Review of Systems - History obtained from the patient and patient filled out questionnaire   Constitutional/general, HEENT, CV, Resp, GI, MSK, Neuro, Psych, Heme/lymph, Skin, Breast ROS: no significant complaints except as noted on HPI    Physical Exam  Visit Vitals  BP (!) 140/93   Pulse 98   Ht 5' 3\" (1.6 m)   Wt 313 lb 6.4 oz (142.2 kg)   LMP 03/28/2023 (Approximate)   BMI 55.52 kg/m²       Constitutional  Appearance: well-nourished, well developed, alert, in no acute distress    HENT  Head and Face: appears normal    Neck  Inspection/Palpation: normal appearance, no masses or tenderness  Lymph Nodes: no lymphadenopathy present  Thyroid: gland size normal, nontender, no nodules or masses present on palpation    Chest  Respiratory Effort: breathing unlabored  Auscultation: normal breath sounds    Cardiovascular  Heart:   Auscultation: regular rate and rhythm without murmur    Breasts  Inspection of Breasts: breasts symmetrical, no skin changes, no discharge present, nipple appearance normal, no skin retraction present  Palpation of Breasts and Axillae: no masses present on palpation, no breast tenderness  Axillary Lymph Nodes: no lymphadenopathy present    Gastrointestinal  Abdominal Examination: abdomen non-tender to palpation, normal bowel sounds, no masses present  Liver and spleen: no hepatomegaly present, spleen not palpable  Hernias: no hernias identified    Genitourinary  External Genitalia: normal appearance for age, no discharge present, no tenderness present, no inflammatory lesions present, no masses present  Vagina: normal vaginal vault without central or paravaginal defects, minimal discharge present, no inflammatory lesions present, no masses present  Bladder: non-tender to palpation  Urethra: appears normal  Cervix: normal   Uterus: normal size, shape and consistency  Adnexa: no adnexal tenderness present, no adnexal masses present  Perineum: perineum within normal limits, no evidence of trauma, no rashes or skin lesions present  Anus: anus within normal limits, no hemorrhoids present  Inguinal Lymph Nodes: no lymphadenopathy present    Skin  General Inspection: no rash, no lesions identified    Neurologic/Psychiatric  Mental Status:  Orientation: grossly oriented to person, place and time  Mood and Affect: mood normal, affect appropriate    Assessment:  41 y.o.  for well woman exam  Her current medical status is satisfactory with no evidence of significant gynecologic issues. Encounter Diagnoses   Name Primary? Encounter for well woman exam with routine gynecological exam Yes    Encounter for screening for human papillomavirus (HPV)     Cervical cancer screening     Encounter for menstrual regulation     Dysmenorrhea     Menorrhagia with regular cycle        Plan:  I recommended follow up one year for routine annual gynecologic exam or sooner prn  Patient should follow up with a primary care physician for chronic medical problems and evaluation of non-gynecologic concerns and to please contact our office with any GYN questions or concerns. I recommend STD testing per CDC guidelines and at patient request.   We discussed elevated blood pressure reading. I recommend BP log/home BP cuff and PCP follow up for additional management/surveillance. We discussed potential causes of symptomatic bleeding: including but not limited to hormonal, medical, infection/inflammation and structural etiologies. We discussed options for managing symptoms including but not limited to observation, NSAIDS, hormonal management, IUDs, ablation, and hysterectomy. Rodriguez Roblero and us, ?  Definitive surgical management  Bleeding precautions reveiwed            Folllow up:  [x] return for annual well woman exam in one year or sooner if she is having problems  [] follow up and ultrasound  [] 6 months  [] 3 months  [] 6 weeks   [] 1 month    Orders Placed This Encounter drospirenone, contraceptive, (Slynd) 4 mg (28) tab    PAP IG, APTIMA HPV AND RFX 16/18,45 ([id number])       No results found for any visits on 04/21/23.

## 2023-04-21 NOTE — PROGRESS NOTES
Cecy Lyn is a 41 y.o. female returns for an annual exam     Chief Complaint   Patient presents with    Well Woman       Patient's last menstrual period was 03/28/2023 (approximate). Her periods are  varying  in flow and usually regular with a 26-32 day interval with 3-7 day duration. She has dysmenorrhea. Problems:  in 2014 she had an endometrial ablation, she reports a stronger odor during her cycle since then, pt reports the pads give her vaginal itching. She wants to discuss options to help her stop her cycles but not gain weight. Birth Control: abstinence. Last Pap: over 5 years ago wnl per pt  She does not have a history of ISHA 2, 3 or cervical cancer. Last Mammogram:  10/5/22 WNL per pt .    Last Bone Density: never obtained  Last colonoscopy: never obtained       Examination chaperoned by Portia Perkins MA. 40

## 2023-05-02 LAB
CYTOLOGIST CVX/VAG CYTO: NORMAL
CYTOLOGY CVX/VAG DOC CYTO: NORMAL
CYTOLOGY CVX/VAG DOC THIN PREP: NORMAL
DX ICD CODE: NORMAL
HPV GENOTYPE REFLEX: NORMAL
HPV I/H RISK 4 DNA CVX QL PROBE+SIG AMP: NEGATIVE
Lab: NORMAL
Lab: NORMAL
OTHER STN SPEC: NORMAL
STAT OF ADQ CVX/VAG CYTO-IMP: NORMAL

## 2023-06-26 RX ORDER — DROSPIRENONE 4 MG/1
1 TABLET, FILM COATED ORAL DAILY
Qty: 90 TABLET | Refills: 2 | OUTPATIENT
Start: 2023-06-26 | End: 2023-09-24

## 2023-08-09 ENCOUNTER — TELEMEDICINE (OUTPATIENT)
Facility: CLINIC | Age: 48
End: 2023-08-09
Payer: COMMERCIAL

## 2023-08-09 ENCOUNTER — CLINICAL DOCUMENTATION (OUTPATIENT)
Age: 48
End: 2023-08-09

## 2023-08-09 DIAGNOSIS — G47.33 OSA (OBSTRUCTIVE SLEEP APNEA): Primary | ICD-10-CM

## 2023-08-09 PROCEDURE — 99213 OFFICE O/P EST LOW 20 MIN: CPT | Performed by: NURSE PRACTITIONER

## 2023-08-09 NOTE — PATIENT INSTRUCTIONS
drowsiness. Medications that impair a drivers attention should have a warning label. Alcohol naturally makes you sleepy and on its own can cause accidents. Combined with excessive drowsiness its effects are amplified. Signs of Drowsy Driving:   * You don't remember driving the last few miles   * You may drift out of your jessee   * You are unable to focus and your thoughts wander   * You may yawn more often than normal   * You have difficulty keeping your eyes open / nodding off   * Missing traffic signs, speeding, or tailgating  Prevention-   Good sleep hygiene, lifestyle and behavioral choices have the most impact on drowsy driving. There is no substitute for sleep and the average person requires 8 hours nightly. If you find yourself driving drowsy, stop and sleep. Consider the sleep hygiene tips provided during your visit as well. Medication Refill Policy: Refills for all medications require 1 week advance notice. Please have your pharmacy fax a refill request. We are unable to fax, or call in \"controled substance\" medications and you will need to pick these prescriptions up from our office.

## 2023-08-09 NOTE — PROGRESS NOTES
PAP order faxed to [facility] today. Patient informed of DME company through [employer].

## 2023-08-09 NOTE — PROGRESS NOTES
[address] [facility],  [address] [facility]  Provider was located at [facility] ([address]): [address] Nept[address] Huynh, APRN - NP on 8/9/2023 at 1:48 PM    An electronic signature was used to authenticate this note.

## 2023-11-08 ENCOUNTER — TELEPHONE (OUTPATIENT)
Age: 48
End: 2023-11-08

## 2023-11-08 NOTE — TELEPHONE ENCOUNTER
Two patient identifiers used      57year old patient last seen in the office on 4/18/2023 for ae and 6/15/2023 for follow up    Patient had her prescription sent on   Medication  Drospirenone (SLYND) 4 MG TABS [[id number]]  Drospirenone (SLYND) 4 MG TABS [[id number]]  ENDED    Order Details  Dose: 1 tablet Route: Oral Frequency: DAILY   Dispense Quantity: 90 tablet Refills: 2          Sig: Take 1 tablet by mouth daily         Start Date: 06/15/23 End Date: 09/13/23 after 90 doses   Written Date: 06/15/23 Expiration Date: 06/14/24   Original Order:  Drospirenone (SLYND) 4 MG TABS [[id number]]   Providers    Authorizing Provider: Ashley Lee MD NPI: [id number]   Ordering User:  Ashley Lee MD          Pharmacy    [address] [place] [address], [address] [place] [address],[address] Floor   [address] [facility], [address] [place] [address], [address]   Phone:  [phone]  Fax:  [phone]   And is not able to get further refills as the above pharmacy has closed    Prescription refill resent as per Md verbal order to get patient to when due for next ae  4/2024

## 2024-01-10 ASSESSMENT — SLEEP AND FATIGUE QUESTIONNAIRES
HOW LIKELY ARE YOU TO NOD OFF OR FALL ASLEEP WHILE SITTING AND READING: 1
DO YOU HAVE DIFFICULTY CONCENTRATING ON THE THINGS YOU DO BECAUSE YOU ARE SLEEPY OR TIRED: NO
HOW LIKELY ARE YOU TO NOD OFF OR FALL ASLEEP WHEN YOU ARE A PASSENGER IN A CAR FOR AN HOUR WITHOUT A BREAK: 2
HOW LIKELY ARE YOU TO NOD OFF OR FALL ASLEEP WHILE SITTING INACTIVE IN A PUBLIC PLACE: WOULD NEVER DOZE
DO YOU HAVE DIFFICULTY OPERATING A MOTOR VEHICLE FOR SHORT DISTANCES (LESS THAN 100 MILES) BECAUSE YOU BECOME SLEEPY: NO
FOSQ SCORE: 20
HOW LIKELY ARE YOU TO NOD OFF OR FALL ASLEEP WHILE SITTING AND TALKING TO SOMEONE: WOULD NEVER DOZE
DO YOU HAVE DIFFICULTY BEING AS ACTIVE AS YOU WANT TO BE IN THE MORNING BECAUSE YOU ARE SLEEPY OR TIRED: NO
HAS YOUR RELATIONSHIP WITH FAMILY, FRIENDS OR WORK COLLEAGUES BEEN AFFECTED BECAUSE YOU ARE SLEEPY OR TIRED: NO
HOW LIKELY ARE YOU TO NOD OFF OR FALL ASLEEP WHILE LYING DOWN TO REST IN THE AFTERNOON WHEN CIRCUMSTANCES PERMIT: 1
HOW LIKELY ARE YOU TO NOD OFF OR FALL ASLEEP WHILE WATCHING TV: SLIGHT CHANCE OF DOZING
ESS TOTAL SCORE: 5
HOW LIKELY ARE YOU TO NOD OFF OR FALL ASLEEP WHILE LYING DOWN TO REST IN THE AFTERNOON WHEN CIRCUMSTANCES PERMIT: SLIGHT CHANCE OF DOZING
DO YOU HAVE DIFFICULTY OPERATING A MOTOR VEHICLE FOR LONG DISTANCES (GREATER THAN 100 MILES) BECAUSE YOU BECOME SLEEPY: NO
DO YOU HAVE DIFFICULTY VISITING YOUR FAMILY OR FRIENDS IN THEIR HOME BECAUSE YOU BECOME SLEEPY OR TIRED: NO
HOW LIKELY ARE YOU TO NOD OFF OR FALL ASLEEP WHILE WATCHING TV: 1
DO YOU GENERALLY HAVE DIFFICULTY REMEMBERING THINGS BECAUSE YOU ARE SLEEPY OR TIRED: NO
HOW LIKELY ARE YOU TO NOD OFF OR FALL ASLEEP WHILE SITTING AND READING: SLIGHT CHANCE OF DOZING
HOW LIKELY ARE YOU TO NOD OFF OR FALL ASLEEP IN A CAR, WHILE STOPPED FOR A FEW MINUTES IN TRAFFIC: 0
HAS YOUR MOOD BEEN AFFECTED BECAUSE YOU ARE SLEEPY OR TIRED: NO
DO YOU HAVE DIFFICULTY WATCHING A MOVIE OR VIDEO BECAUSE YOU BECOME SLEEPY OR TIRED: NO
HOW LIKELY ARE YOU TO NOD OFF OR FALL ASLEEP WHILE SITTING QUIETLY AFTER LUNCH WITHOUT ALCOHOL: 0
DO YOU HAVE DIFFICULTY BEING AS ACTIVE AS YOU WANT TO BE IN THE EVENING BECAUSE YOU ARE SLEEPY OR TIRED: NO
HOW LIKELY ARE YOU TO NOD OFF OR FALL ASLEEP WHILE SITTING AND TALKING TO SOMEONE: 0
HOW LIKELY ARE YOU TO NOD OFF OR FALL ASLEEP IN A CAR, WHILE STOPPED FOR A FEW MINUTES IN TRAFFIC: WOULD NEVER DOZE
HOW LIKELY ARE YOU TO NOD OFF OR FALL ASLEEP WHILE SITTING QUIETLY AFTER LUNCH WITHOUT ALCOHOL: WOULD NEVER DOZE
HOW LIKELY ARE YOU TO NOD OFF OR FALL ASLEEP WHEN YOU ARE A PASSENGER IN A CAR FOR AN HOUR WITHOUT A BREAK: MODERATE CHANCE OF DOZING
HOW LIKELY ARE YOU TO NOD OFF OR FALL ASLEEP WHILE SITTING INACTIVE IN A PUBLIC PLACE: 0

## 2024-01-10 NOTE — PROGRESS NOTES
heartburn; Negative significant mood issues    Vitals reported by patient        No data to display               Calculated BMI 56    Physical Exam completed by visual and auditory observation of patient with verbal input from patient.    General:   Alert, oriented, not in acute distress   Eyes:  Anicteric Sclerae; no obvious strabismus   Nose:  No obvious nasal septum deviation    Neck:   Midline trachea, no visible mass   Chest/Lungs:  Respiratory effort normal, no visualized signs of difficulty breathing or respiratory distress   CVS:  No JVD   Extremities:  No obvious rashes noted on face, neck, or hands   Neuro:  No facial asymmetry, no focal deficits; no obvious tremor    Psych:  Normal affect,  normal countenance     Lindsay Maddox is being evaluated by a Virtual Visit (video visit) encounter to address concerns as mentioned above.  A caregiver was present when appropriate. Due to this being a TeleHealth encounter (During COVID-19 public health emergency), evaluation of the following organ systems was limited: Vitals/Constitutional/EENT/Resp/CV/GI//MS/Neuro/Skin/Heme-Lymph-Imm.  Pursuant to the emergency declaration under the [place] Act and the National Emergencies Act, 1135 waiver authority and the Coronavirus Preparedness and Response Supplemental Appropriations Act, this Virtual Visit was conducted with patient's (and/or legal guardian's) consent, to reduce the patient's risk of exposure to COVID-19 and provide necessary medical care. The patient (or guardian if applicable) is aware that this is a billable service, which includes applicable copays.     Patient identification was verified at the start of the visit: Yes using name and date of birth. Patient's phone number [phone] (home)  was confirmed for accuracy.  She gives permission for messages regarding results and appointments to be left at that number.    Services were provided through a video synchronous discussion virtually to

## 2024-01-11 ENCOUNTER — TELEMEDICINE (OUTPATIENT)
Age: 49
End: 2024-01-11
Payer: COMMERCIAL

## 2024-01-11 DIAGNOSIS — G47.33 OSA (OBSTRUCTIVE SLEEP APNEA): Primary | ICD-10-CM

## 2024-01-11 DIAGNOSIS — I10 PRIMARY HYPERTENSION: ICD-10-CM

## 2024-01-11 PROCEDURE — 99213 OFFICE O/P EST LOW 20 MIN: CPT | Performed by: NURSE PRACTITIONER

## 2024-01-11 NOTE — PATIENT INSTRUCTIONS
[address]  [place] [address]  Tel.  [phone]  Fax. [phone] [address]  [place] [address]  Tel.  [phone]  Fax. [phone] [address] [place] [address]  Tel.  [phone]  Fax. [phone]     Learning About CPAP for Sleep Apnea  What is CPAP?              CPAP is a small machine that you use at home every night while you sleep. It increases air pressure in your throat to keep your airway open. When you have sleep apnea, this can help you sleep better so you feel much better. CPAP stands for \"continuous positive airway pressure.\"  The CPAP machine will have one of the following:  A mask that covers your nose and mouth  Prongs that fit into your nose  A mask that covers your nose only, the most common type. This type is called NCPAP. The N stands for \"nasal.\"  Why is it done?  CPAP is usually the best treatment for obstructive sleep apnea. It is the first treatment choice and the most widely used. Your doctor may suggest CPAP if you have:  Moderate to severe sleep apnea.  Sleep apnea and coronary artery disease (CAD) or heart failure.  How does it help?  CPAP can help you have more normal sleep, so you feel less sleepy and more alert during the daytime.  CPAP may help keep heart failure or other heart problems from getting worse.  NCPAP may help lower your blood pressure.  If you use CPAP, your bed partner may also sleep better because you are not snoring or restless.  What are the side effects?  Some people who use CPAP have:  A dry or stuffy nose and a sore throat.  Irritated skin on the face.  Sore eyes.  Bloating.  If you have any of these problems, work with your doctor to fix them. Here are some things you can try:  Be sure the mask or nasal prongs fit well.  See if your doctor can adjust the pressure of your CPAP.  If your nose is dry, try a humidifier.  If your nose is runny or stuffy, try decongestant medicine or a steroid

## 2024-01-12 ENCOUNTER — TELEPHONE (OUTPATIENT)
Age: 49
End: 2024-01-12

## 2024-01-12 NOTE — TELEPHONE ENCOUNTER
Two patient identification used       48 year old patient last seen in the office on 4/21/2023 for ae    Patient was sent in a prescription for   Drospirenone 4 MG TABS [[id number]]    Order Details  Dose: 1 tablet Route: Oral Frequency: DAILY   Dispense Quantity: 84 tablet Refills: 1          Sig: Take 1 tablet by mouth daily       Patient is calling to say that her insurance will not cover the medication    Can you check about a PA    Thank you

## 2024-01-15 ENCOUNTER — PATIENT MESSAGE (OUTPATIENT)
Age: 49
End: 2024-01-15

## 2024-01-15 NOTE — TELEPHONE ENCOUNTER
Didi jacobs   to Me   TS    1/15/24 10:18 AM  Does the pharmacy have her new Contraceptive Card? Also we do not have a copy of her new card. I would need this in order to do a PA.    This nurse attempted to reach the patient and left a detailed message regarding the requested information to be able to do a PA

## 2024-01-17 NOTE — TELEPHONE ENCOUNTER
Two patient identifiers used    Pharmacy calling to say that they ran the prescription , even though ( office has gotten the medication approved) and the medication comes up saying that it is excluded form her insurance and we need to do an appeal    Pharmacy was transferred to  who does PA    Patient was sent a message by TS

## 2024-01-24 ENCOUNTER — HOSPITAL ENCOUNTER (OUTPATIENT)
Facility: HOSPITAL | Age: 49
Discharge: HOME | End: 2024-01-27
Payer: COMMERCIAL

## 2024-01-24 DIAGNOSIS — G47.33 OSA (OBSTRUCTIVE SLEEP APNEA): ICD-10-CM

## 2024-01-24 PROCEDURE — 95811 POLYSOM 6/>YRS CPAP 4/> PARM: CPT | Performed by: INTERNAL MEDICINE

## 2024-01-25 VITALS
TEMPERATURE: 98.3 F | WEIGHT: 293 LBS | HEART RATE: 82 BPM | OXYGEN SATURATION: 95 % | BODY MASS INDEX: 51.91 KG/M2 | HEIGHT: 63 IN | SYSTOLIC BLOOD PRESSURE: 142 MMHG | DIASTOLIC BLOOD PRESSURE: 88 MMHG

## 2024-01-25 NOTE — PROGRESS NOTES
Sleep Study Technical Notes   Disclaimer:  all notes have not been confirmed by interpreting physician      PRE-Test:  Lindsay Maddox (: 1975) arrived in the lobby. The patient was taken to the Sleep Center and taken directly to his/her room.   Procedure explained to the patient and questions were answered. The patient expressed understanding of the procedure. Electrodes were applied without incident. The patient was placed in bed and the study was started.    Acquisition Notes:  Lights off: 1113    Respiratory events:   A__yesY/N    H__yesY/N  C__yesY/N  M_Y/N  ECG:    Possible arrhythmias:   Mely/N  Snoring:  no   Sleep Stages:  REM   Mely/N    PAP titration information in Sleep Study CPAP Evaluation  PAP via F&P Michael Wide Nasal       POST Test:  Patient was awakened.  Electrodes were removed.    The patient was discharged after answering the Post Questionnaire.    Equipment and room cleaned per infection control policy.

## 2024-01-27 ENCOUNTER — TELEPHONE (OUTPATIENT)
Facility: HOSPITAL | Age: 49
End: 2024-01-27

## 2024-01-31 NOTE — TELEPHONE ENCOUNTER
Katedylon BETH Bañueloss is to be contacted by  regarding results of Sleep Testing which was indicative of an average AHI of 6.5 per hour on CPAP therapy. A total of 34 events were scored of which 21 were Central Apneas.    Patient should have a blood gases analysis and if criteria for obesity hypoventilation are met she should return for an iVAPS titration.    If hypercapnia is not present of the blood gas analysis she should return for a Bi-Level titration.

## 2024-02-20 SDOH — HEALTH STABILITY: PHYSICAL HEALTH: ON AVERAGE, HOW MANY DAYS PER WEEK DO YOU ENGAGE IN MODERATE TO STRENUOUS EXERCISE (LIKE A BRISK WALK)?: 0 DAYS

## 2024-02-20 SDOH — HEALTH STABILITY: PHYSICAL HEALTH: ON AVERAGE, HOW MANY MINUTES DO YOU ENGAGE IN EXERCISE AT THIS LEVEL?: 20 MIN

## 2024-02-20 ASSESSMENT — SLEEP AND FATIGUE QUESTIONNAIRES
HOW LIKELY ARE YOU TO NOD OFF OR FALL ASLEEP WHILE SITTING AND TALKING TO SOMEONE: WOULD NEVER DOZE
DO YOU HAVE DIFFICULTY OPERATING A MOTOR VEHICLE FOR SHORT DISTANCES (LESS THAN 100 MILES) BECAUSE YOU BECOME SLEEPY: NO
HOW LIKELY ARE YOU TO NOD OFF OR FALL ASLEEP WHILE WATCHING TV: 1
HOW LIKELY ARE YOU TO NOD OFF OR FALL ASLEEP WHILE LYING DOWN TO REST IN THE AFTERNOON WHEN CIRCUMSTANCES PERMIT: SLIGHT CHANCE OF DOZING
HOW LIKELY ARE YOU TO NOD OFF OR FALL ASLEEP WHILE SITTING INACTIVE IN A PUBLIC PLACE: 0
HOW LIKELY ARE YOU TO NOD OFF OR FALL ASLEEP WHILE SITTING INACTIVE IN A PUBLIC PLACE: WOULD NEVER DOZE
DO YOU GENERALLY HAVE DIFFICULTY REMEMBERING THINGS BECAUSE YOU ARE SLEEPY OR TIRED: YES, A LITTLE
HAS YOUR MOOD BEEN AFFECTED BECAUSE YOU ARE SLEEPY OR TIRED: NO
HOW LIKELY ARE YOU TO NOD OFF OR FALL ASLEEP WHEN YOU ARE A PASSENGER IN A CAR FOR AN HOUR WITHOUT A BREAK: 1
ESS TOTAL SCORE: 4
DO YOU HAVE DIFFICULTY BEING AS ACTIVE AS YOU WANT TO BE IN THE EVENING BECAUSE YOU ARE SLEEPY OR TIRED: YES, LITTLE
HAS YOUR RELATIONSHIP WITH FAMILY, FRIENDS OR WORK COLLEAGUES BEEN AFFECTED BECAUSE YOU ARE SLEEPY OR TIRED: NO
HOW LIKELY ARE YOU TO NOD OFF OR FALL ASLEEP WHILE WATCHING TV: SLIGHT CHANCE OF DOZING
HOW LIKELY ARE YOU TO NOD OFF OR FALL ASLEEP IN A CAR, WHILE STOPPED FOR A FEW MINUTES IN TRAFFIC: 0
HOW LIKELY ARE YOU TO NOD OFF OR FALL ASLEEP WHEN YOU ARE A PASSENGER IN A CAR FOR AN HOUR WITHOUT A BREAK: SLIGHT CHANCE OF DOZING
HOW LIKELY ARE YOU TO NOD OFF OR FALL ASLEEP WHILE SITTING QUIETLY AFTER LUNCH WITHOUT ALCOHOL: WOULD NEVER DOZE
DO YOU HAVE DIFFICULTY CONCENTRATING ON THE THINGS YOU DO BECAUSE YOU ARE SLEEPY OR TIRED: YES, A LITTLE
FOSQ SCORE: 18
DO YOU HAVE DIFFICULTY BEING AS ACTIVE AS YOU WANT TO BE IN THE MORNING BECAUSE YOU ARE SLEEPY OR TIRED: NO
DO YOU HAVE DIFFICULTY OPERATING A MOTOR VEHICLE FOR LONG DISTANCES (GREATER THAN 100 MILES) BECAUSE YOU BECOME SLEEPY: YES, A LITTLE
DO YOU HAVE DIFFICULTY WATCHING A MOVIE OR VIDEO BECAUSE YOU BECOME SLEEPY OR TIRED: NO
HOW LIKELY ARE YOU TO NOD OFF OR FALL ASLEEP WHILE SITTING AND READING: SLIGHT CHANCE OF DOZING
HOW LIKELY ARE YOU TO NOD OFF OR FALL ASLEEP WHILE SITTING AND READING: 1
DO YOU HAVE DIFFICULTY VISITING YOUR FAMILY OR FRIENDS IN THEIR HOME BECAUSE YOU BECOME SLEEPY OR TIRED: NO
HOW LIKELY ARE YOU TO NOD OFF OR FALL ASLEEP WHILE LYING DOWN TO REST IN THE AFTERNOON WHEN CIRCUMSTANCES PERMIT: 1
HOW LIKELY ARE YOU TO NOD OFF OR FALL ASLEEP WHILE SITTING AND TALKING TO SOMEONE: 0
HOW LIKELY ARE YOU TO NOD OFF OR FALL ASLEEP IN A CAR, WHILE STOPPED FOR A FEW MINUTES IN TRAFFIC: WOULD NEVER DOZE
HOW LIKELY ARE YOU TO NOD OFF OR FALL ASLEEP WHILE SITTING QUIETLY AFTER LUNCH WITHOUT ALCOHOL: 0

## 2024-02-21 NOTE — PROGRESS NOTES
[address]   [place] [address]   Tel.  [phone]   Fax. [phone]  [address]   [place] [address]   Tel.  [phone]   Fax. [phone] [address] [place] [address]   Tel.  [phone]   Fax. [phone]     Lindsay Maddox (: 1975) is a 48 y.o. female, established patient, seen for positive airway pressure follow-up, she was last seen by me on 2023, prior notes reviewed in detail.  Novasom home sleep test showed AHI of 6.9/hr. She was started on PAP therapy. A titration study  showed She is seen today for follow up.      ASSESSMENT/PLAN:   Diagnosis Orders   1. BRANDEE (obstructive sleep apnea)  DME Order for (Specify) as OP      2. Primary hypertension        3. BMI 50.0-59.9, adult (HCC)          AHI = 6.9 .  On CPAP :  5-10 cmH2O. Set up .    She is not adherent with PAP therapy though PAP continues to benefit patient and remains necessary for control of her sleep apnea.     No follow-up provider specified.    Sleep Apnea -  Sleep study results reviewed with her. She felt that she slept exceptionally well during the study and woke up feeling refreshed. She was satisfied with the mask she was using and felt that she could sleep on her stomach comfortably. F&P Michael Wide Nasal Mask used. She is seeing her PCP on Tuesday and will ask to have a blood gas added onto her usual labs scheduled for that day. I will adjust her pressure settings remotely to APAP 5-6 cmH2O and order her the mask she used during the study. She said it does not connect to her current device. We have discussed the need for further titration study should her CO2 remained elevated. We will trial the new pressure settings. I will connect with her through [employer] regarding next steps.     Orders Placed This Encounter   Procedures    DME Order for (Specify) as OP     Diagnosis: (G47.33) BRANDEE (obstructive sleep apnea)  (primary encounter diagnosis)

## 2024-02-22 ENCOUNTER — CLINICAL DOCUMENTATION (OUTPATIENT)
Age: 49
End: 2024-02-22

## 2024-02-22 ENCOUNTER — TELEMEDICINE (OUTPATIENT)
Age: 49
End: 2024-02-22
Payer: COMMERCIAL

## 2024-02-22 DIAGNOSIS — G47.33 OSA (OBSTRUCTIVE SLEEP APNEA): Primary | ICD-10-CM

## 2024-02-22 DIAGNOSIS — I10 PRIMARY HYPERTENSION: ICD-10-CM

## 2024-02-22 PROCEDURE — 99213 OFFICE O/P EST LOW 20 MIN: CPT | Performed by: NURSE PRACTITIONER

## 2024-02-22 NOTE — PATIENT INSTRUCTIONS
[address]  [place] [address]  Tel.  [phone]  Fax. [phone] [address]  [place] [address]  Tel.  [phone]  Fax. [phone] [address] [address]  Tel.  [phone]  Fax. [phone]     Learning About CPAP for Sleep Apnea  What is CPAP?              CPAP is a small machine that you use at home every night while you sleep. It increases air pressure in your throat to keep your airway open. When you have sleep apnea, this can help you sleep better so you feel much better. CPAP stands for \"continuous positive airway pressure.\"  The CPAP machine will have one of the following:  A mask that covers your nose and mouth  Prongs that fit into your nose  A mask that covers your nose only, the most common type. This type is called NCPAP. The N stands for \"nasal.\"  Why is it done?  CPAP is usually the best treatment for obstructive sleep apnea. It is the first treatment choice and the most widely used. Your doctor may suggest CPAP if you have:  Moderate to severe sleep apnea.  Sleep apnea and coronary artery disease (CAD) or heart failure.  How does it help?  CPAP can help you have more normal sleep, so you feel less sleepy and more alert during the daytime.  CPAP may help keep heart failure or other heart problems from getting worse.  NCPAP may help lower your blood pressure.  If you use CPAP, your bed partner may also sleep better because you are not snoring or restless.  What are the side effects?  Some people who use CPAP have:  A dry or stuffy nose and a sore throat.  Irritated skin on the face.  Sore eyes.  Bloating.  If you have any of these problems, work with your doctor to fix them. Here are some things you can try:  Be sure the mask or nasal prongs fit well.  See if your doctor can adjust the pressure of your CPAP.  If your nose is dry, try a humidifier.  If your nose is runny or stuffy, try decongestant medicine or a steroid

## 2024-02-25 SDOH — HEALTH STABILITY: PHYSICAL HEALTH: ON AVERAGE, HOW MANY MINUTES DO YOU ENGAGE IN EXERCISE AT THIS LEVEL?: 20 MIN

## 2024-02-25 SDOH — HEALTH STABILITY: PHYSICAL HEALTH: ON AVERAGE, HOW MANY DAYS PER WEEK DO YOU ENGAGE IN MODERATE TO STRENUOUS EXERCISE (LIKE A BRISK WALK)?: 0 DAYS

## 2024-02-26 ENCOUNTER — HOSPITAL ENCOUNTER (EMERGENCY)
Facility: HOSPITAL | Age: 49
Discharge: HOME | End: 2024-02-26
Attending: STUDENT IN AN ORGANIZED HEALTH CARE EDUCATION/TRAINING PROGRAM
Payer: COMMERCIAL

## 2024-02-26 ENCOUNTER — APPOINTMENT (OUTPATIENT)
Facility: HOSPITAL | Age: 49
End: 2024-02-26
Payer: COMMERCIAL

## 2024-02-26 VITALS
RESPIRATION RATE: 18 BRPM | SYSTOLIC BLOOD PRESSURE: 130 MMHG | TEMPERATURE: 98 F | WEIGHT: 293 LBS | BODY MASS INDEX: 51.91 KG/M2 | OXYGEN SATURATION: 100 % | DIASTOLIC BLOOD PRESSURE: 80 MMHG | HEART RATE: 88 BPM | HEIGHT: 63 IN

## 2024-02-26 DIAGNOSIS — M25.562 LEFT KNEE PAIN, UNSPECIFIED CHRONICITY: Primary | ICD-10-CM

## 2024-02-26 DIAGNOSIS — M17.12 OSTEOARTHRITIS OF LEFT KNEE, UNSPECIFIED OSTEOARTHRITIS TYPE: ICD-10-CM

## 2024-02-26 PROBLEM — I10 HYPERTENSIVE DISORDER: Status: ACTIVE | Noted: 2024-02-26

## 2024-02-26 PROBLEM — I10 BENIGN ESSENTIAL HYPERTENSION: Status: ACTIVE | Noted: 2024-02-26

## 2024-02-26 PROBLEM — E66.01 MORBID OBESITY WITH BMI OF 40.0-44.9, ADULT (HCC): Status: ACTIVE | Noted: 2024-02-26

## 2024-02-26 PROBLEM — F32.9 MAJOR DEPRESSIVE DISORDER: Status: ACTIVE | Noted: 2024-02-26

## 2024-02-26 PROBLEM — G43.909 MIGRAINE: Status: ACTIVE | Noted: 2024-02-26

## 2024-02-26 PROCEDURE — 99283 EMERGENCY DEPT VISIT LOW MDM: CPT

## 2024-02-26 PROCEDURE — 73562 X-RAY EXAM OF KNEE 3: CPT

## 2024-02-26 PROCEDURE — 6370000000 HC RX 637 (ALT 250 FOR IP): Performed by: STUDENT IN AN ORGANIZED HEALTH CARE EDUCATION/TRAINING PROGRAM

## 2024-02-26 RX ORDER — IBUPROFEN 600 MG/1
600 TABLET ORAL
Status: COMPLETED | OUTPATIENT
Start: 2024-02-26 | End: 2024-02-26

## 2024-02-26 RX ORDER — DROSPIRENONE 4 MG/1
TABLET, FILM COATED ORAL
COMMUNITY
End: 2024-02-27 | Stop reason: ALTCHOICE

## 2024-02-26 RX ADMIN — IBUPROFEN 600 MG: 600 TABLET, FILM COATED ORAL at 08:24

## 2024-02-26 ASSESSMENT — PAIN SCALES - GENERAL
PAINLEVEL_OUTOF10: 0
PAINLEVEL_OUTOF10: 9
PAINLEVEL_OUTOF10: 0

## 2024-02-26 ASSESSMENT — LIFESTYLE VARIABLES
HOW OFTEN DO YOU HAVE A DRINK CONTAINING ALCOHOL: NEVER
HOW MANY STANDARD DRINKS CONTAINING ALCOHOL DO YOU HAVE ON A TYPICAL DAY: PATIENT DOES NOT DRINK

## 2024-02-26 ASSESSMENT — PAIN - FUNCTIONAL ASSESSMENT: PAIN_FUNCTIONAL_ASSESSMENT: 0-10

## 2024-02-26 NOTE — ED NOTES
Provider at bedside for dispo and follow up. Discharge plan reviewed and paperwork signed, teaching completed including treatment received, medications and follow up plan of care, pain level within manageable comfortable limits, ambulatory to exit, gait steady, safety maintained.

## 2024-02-26 NOTE — DISCHARGE INSTRUCTIONS
Thank you!  Thank you for allowing me to care for you in the emergency department. It is my goal to provide you with excellent care.  Please fill out the survey that will come to you by mail or email since we listen to your feedback!     Below you will find a list of your tests from today's visit.  Should you have any questions, please do not hesitate to call the emergency department.    Labs  No results found for this or any previous visit (from the past 12 hour(s)).    Radiologic Studies  XR KNEE LEFT (3 VIEWS)   Final Result      Mild spurring without fracture.        ------------------------------------------------------------------------------------------------------------  The exam and treatment you received in the Emergency Department were for an urgent problem and are not intended as complete care. It is important that you follow-up with a doctor, nurse practitioner, or physician assistant to:  (1) confirm your diagnosis,  (2) re-evaluation of changes in your illness and treatment, and (3) for ongoing care. Please take your discharge instructions with you when you go to your follow-up appointment.     If you have any problem arranging a follow-up appointment, contact the Emergency Department.  If your symptoms become worse or you do not improve as expected and you are unable to reach your health care provider, please return to the Emergency Department. We are available 24 hours a day.     If a prescription has been provided, please have it filled as soon as possible to prevent a delay in treatment. If you have any questions or reservations about taking the medication due to side effects or interactions with other medications, please call your primary care provider or contact the ER.

## 2024-02-26 NOTE — ED PROVIDER NOTES
notes): Prior medical records and Nursing notes    Vitals:    Vitals:    02/26/24 0724 02/26/24 0917 02/26/24 0937   BP: 136/88  130/80   Pulse: (!) 109  88   Resp: 20  18   Temp: 98.1 °F (36.7 °C)  98 °F (36.7 °C)   SpO2: 100%  100%   Weight:  (!) 145.2 kg (320 lb)    Height:  1.6 m (5' 3\")         ED COURSE  ED Course as of 02/27/24 0721 Mon Feb 26, 2024   0915 XR left knee    FINDINGS: Three views of the left knee demonstrate normal alignment and no  fracture. Mild tricompartmental spurring without joint space loss.. There is no  effusion.     IMPRESSION:     Mild spurring without fracture. [BD]   0915 Providing patient with Ortho follow-up information is needed, advising that she follow-up with Ortho or her primary care provider for further management.  Return precautions discussed. [BD]      ED Course User Index  [BD] Edmond Claudio MD       SEPSIS Reassessment: Sepsis reassessment not applicable    Clinical Management Tools:  Not Applicable    Patient was given the following medications:  Medications   ibuprofen (ADVIL;MOTRIN) tablet 600 mg (600 mg Oral Given 2/26/24 0824)       CONSULTS:   None     Social Determinants affecting Diagnosis/Treatment: None    Smoking Cessation: Not Applicable    PROCEDURES   Unless otherwise noted above, none.  Procedures      CRITICAL CARE TIME   Patient does not meet Critical Care Time, 0 minutes    FINAL IMPRESSION     1. Left knee pain, unspecified chronicity    2. Osteoarthritis of left knee, unspecified osteoarthritis type          DISPOSITION/PLAN   DISPOSITION Decision To Discharge 02/26/2024 09:15:31 AM    Discharge Note: The patient is stable for discharge home. The signs, symptoms, diagnosis, and discharge instructions have been discussed, understanding conveyed, and agreed upon. The patient is to follow up as recommended or return to ER should their symptoms worsen.      PATIENT REFERRED TO:  Andi Macias MD  [address] [facility]

## 2024-02-26 NOTE — ED TRIAGE NOTES
C/o left knee pain x 1.5 weeks. States pain has increased significantly and now is unable to walk far distances and having difficulty sitting and standing.

## 2024-02-27 ENCOUNTER — OFFICE VISIT (OUTPATIENT)
Facility: CLINIC | Age: 49
End: 2024-02-27
Payer: COMMERCIAL

## 2024-02-27 VITALS
SYSTOLIC BLOOD PRESSURE: 138 MMHG | HEIGHT: 63 IN | OXYGEN SATURATION: 96 % | RESPIRATION RATE: 20 BRPM | HEART RATE: 93 BPM | BODY MASS INDEX: 51.91 KG/M2 | DIASTOLIC BLOOD PRESSURE: 72 MMHG | WEIGHT: 293 LBS | TEMPERATURE: 98.3 F

## 2024-02-27 DIAGNOSIS — I10 BENIGN ESSENTIAL HYPERTENSION: ICD-10-CM

## 2024-02-27 DIAGNOSIS — Z71.89 ACP (ADVANCE CARE PLANNING): ICD-10-CM

## 2024-02-27 DIAGNOSIS — E66.01 MORBID OBESITY WITH BMI OF 40.0-44.9, ADULT (HCC): ICD-10-CM

## 2024-02-27 DIAGNOSIS — Z12.11 ENCOUNTER FOR SCREENING FOR MALIGNANT NEOPLASM OF COLON: ICD-10-CM

## 2024-02-27 DIAGNOSIS — Z11.4 ENCOUNTER FOR SCREENING FOR HIV: ICD-10-CM

## 2024-02-27 DIAGNOSIS — Z00.00 ENCOUNTER FOR WELL ADULT EXAM WITHOUT ABNORMAL FINDINGS: Primary | ICD-10-CM

## 2024-02-27 DIAGNOSIS — Z11.59 ENCOUNTER FOR HEPATITIS C SCREENING TEST FOR LOW RISK PATIENT: ICD-10-CM

## 2024-02-27 DIAGNOSIS — G43.709 CHRONIC MIGRAINE WITHOUT AURA WITHOUT STATUS MIGRAINOSUS, NOT INTRACTABLE: ICD-10-CM

## 2024-02-27 DIAGNOSIS — G47.33 OSA (OBSTRUCTIVE SLEEP APNEA): ICD-10-CM

## 2024-02-27 DIAGNOSIS — F33.41 RECURRENT MAJOR DEPRESSIVE DISORDER, IN PARTIAL REMISSION (HCC): ICD-10-CM

## 2024-02-27 PROCEDURE — 99386 PREV VISIT NEW AGE 40-64: CPT

## 2024-02-27 PROCEDURE — 3078F DIAST BP <80 MM HG: CPT

## 2024-02-27 PROCEDURE — 3075F SYST BP GE 130 - 139MM HG: CPT

## 2024-02-27 RX ORDER — TOPIRAMATE 50 MG/1
TABLET, FILM COATED ORAL
Qty: 27 TABLET | Refills: 0 | Status: SHIPPED | OUTPATIENT
Start: 2024-02-27

## 2024-02-27 SDOH — ECONOMIC STABILITY: HOUSING INSECURITY
IN THE LAST 12 MONTHS, WAS THERE A TIME WHEN YOU DID NOT HAVE A STEADY PLACE TO SLEEP OR SLEPT IN A SHELTER (INCLUDING NOW)?: NO

## 2024-02-27 SDOH — ECONOMIC STABILITY: FOOD INSECURITY: WITHIN THE PAST 12 MONTHS, YOU WORRIED THAT YOUR FOOD WOULD RUN OUT BEFORE YOU GOT MONEY TO BUY MORE.: OFTEN TRUE

## 2024-02-27 SDOH — ECONOMIC STABILITY: INCOME INSECURITY: IN THE LAST 12 MONTHS, WAS THERE A TIME WHEN YOU WERE NOT ABLE TO PAY THE MORTGAGE OR RENT ON TIME?: NO

## 2024-02-27 SDOH — ECONOMIC STABILITY: TRANSPORTATION INSECURITY
IN THE PAST 12 MONTHS, HAS THE LACK OF TRANSPORTATION KEPT YOU FROM MEDICAL APPOINTMENTS OR FROM GETTING MEDICATIONS?: YES

## 2024-02-27 SDOH — ECONOMIC STABILITY: INCOME INSECURITY: HOW HARD IS IT FOR YOU TO PAY FOR THE VERY BASICS LIKE FOOD, HOUSING, MEDICAL CARE, AND HEATING?: SOMEWHAT HARD

## 2024-02-27 SDOH — ECONOMIC STABILITY: TRANSPORTATION INSECURITY
IN THE PAST 12 MONTHS, HAS LACK OF TRANSPORTATION KEPT YOU FROM MEETINGS, WORK, OR FROM GETTING THINGS NEEDED FOR DAILY LIVING?: YES

## 2024-02-27 SDOH — ECONOMIC STABILITY: FOOD INSECURITY: WITHIN THE PAST 12 MONTHS, THE FOOD YOU BOUGHT JUST DIDN'T LAST AND YOU DIDN'T HAVE MONEY TO GET MORE.: OFTEN TRUE

## 2024-02-27 ASSESSMENT — ENCOUNTER SYMPTOMS
CONSTIPATION: 0
ABDOMINAL PAIN: 0
BLOOD IN STOOL: 0
RHINORRHEA: 0
VOMITING: 0
EYE PAIN: 0
NAUSEA: 0
SINUS PAIN: 0
SORE THROAT: 0
DIARRHEA: 0
BACK PAIN: 0

## 2024-02-27 ASSESSMENT — PATIENT HEALTH QUESTIONNAIRE - PHQ9
2. FEELING DOWN, DEPRESSED OR HOPELESS: 0
SUM OF ALL RESPONSES TO PHQ QUESTIONS 1-9: 0
SUM OF ALL RESPONSES TO PHQ9 QUESTIONS 1 & 2: 0
1. LITTLE INTEREST OR PLEASURE IN DOING THINGS: 0

## 2024-02-27 NOTE — PROGRESS NOTES
Chief Complaint   Patient presents with    New Patient    Annual Exam     Concerned about overall health. Wants lab work done.    Weight Loss     Discuss weight loss options    Osteoarthritis     Was seen at [facility] ED yesterday for left knee pain.  Pain much better today after resting knee and using Diclofenac gel.     1. Have you been to the ER, urgent care clinic since your last visit?  Hospitalized since your last visit?Yes When: 02/26/2024 [facility] Left knee pain.    2. Have you seen or consulted any other health care providers outside of the [facility] since your last visit?  Include any pap smears or colon screening. No    
Dispense: 27 tablet; Refill: 0  Not at goal.  Start Topiramate for migraine prevention as directed.  Notify provider if no improvement in 2 weeks.   Continue Excedrin prn migraine as directed.  Avoid known triggers when possible.    10. Encounter for screening for malignant neoplasm of colon  - [facility] -  Jacqueline Taylor Jr., MD, Gastroenterology, [place]  Referral sent to [place] for routine colorectal cancer screening via colonoscopy.  Educated patient on importance in screening for prevention of colorectal cancer.          Personalized Preventive Plan   Current Health Maintenance Status    There is no immunization history on file for this patient.     Health Maintenance   Topic Date Due    Hepatitis C screen  Never done    Diabetes screen  Never done    Lipids  Never done    Colorectal Cancer Screen  Never done    Flu vaccine (1) 02/27/2025 (Originally 8/1/2023)    Hepatitis B vaccine (1 of 3 - 3-dose series) 02/27/2029 (Originally 1975)    DTaP/Tdap/Td vaccine (1 - Tdap) 02/27/2029 (Originally 9/9/1996)    COVID-19 Vaccine (1) 02/27/2029 (Originally 3/9/1978)    Depression Monitoring  02/27/2025    Cervical cancer screen  04/21/2028    HIV screen  Completed    Hepatitis A vaccine  Aged Out    Hib vaccine  Aged Out    HPV vaccine  Aged Out    Polio vaccine  Aged Out    Meningococcal (ACWY) vaccine  Aged Out    Pneumococcal 0-64 years Vaccine  Aged Out    Depression Screen  Discontinued     Recommendations for Preventive Services Due: see orders and patient instructions/AVS.    Return in about 3 months (around 5/27/2024), or if symptoms worsen or fail to improve, for htn fu.          Advance Care Planning   Discussed the patient’s choices for care and treatment preferences in case of a health event that adversely affects decision-making abilities or is life-limiting. Recommended the patient document care preferences in state-specific advance directives. Also reviewed the process of designating a trusted

## 2024-02-27 NOTE — PATIENT INSTRUCTIONS
eat over time, although it will always be important to:  Eat small, frequent meals and not skip meals   Chew your food slowly and completely   Avoid eating while \"distracted\" (such as eating while watching TV)   Stop eating when you feel full   Drink liquids at least 30 minutes before or after eating   Avoid foods high in fat or sugar   Take vitamin supplements, as recommended  It can take several months to learn to listen to your body so that you know when you are hungry and when you are full. You may dislike foods you previously loved, and you may begin to prefer new foods. This can be a frustrating process for some people, so talk to your dietitian if you are having trouble.  It usually takes between one and two years to lose weight after surgery. After reaching their goal weight, some people have plastic surgery (called \"body contouring\") to remove excess skin from the body, particularly in the abdominal area.  Before you decide to have weight loss surgery, you must commit to staying healthy for life. This includes following up with your healthcare team, exercising most days of the week, and eating a sensible diet every day. It can be difficult to develop new eating and exercise habits after weight loss surgery, and you will have to work hard to stick to your goals.  Recovering from surgery and losing weight can be stressful and emotional, and it is important to have the support of family and friends. Working with a , therapist, or support group can help you through the ups and downs.  WHERE TO GET MORE INFORMATION -- Your healthcare provider is the best source of information for questions and concerns related to your medical problem.  This article will be updated as needed every four months on our Web site ([email or web address])    High-Fiber Diet     What Is Fiber?   Dietary fiber is a form of carbohydrate found in plants that cannot be digested by humans. All plants contain fiber, including

## 2024-02-28 LAB
ALBUMIN SERPL-MCNC: 3.9 G/DL (ref 3.9–4.9)
ALBUMIN/GLOB SERPL: 1.3 {RATIO} (ref 1.2–2.2)
ALP SERPL-CCNC: 85 IU/L (ref 44–121)
ALT SERPL-CCNC: 14 IU/L (ref 0–32)
APPEARANCE UR: CLEAR
AST SERPL-CCNC: 12 IU/L (ref 0–40)
BACTERIA #/AREA URNS HPF: NORMAL /[HPF]
BASOPHILS # BLD AUTO: 0.1 X10E3/UL (ref 0–0.2)
BASOPHILS NFR BLD AUTO: 1 %
BILIRUB SERPL-MCNC: 0.5 MG/DL (ref 0–1.2)
BILIRUB UR QL STRIP: NEGATIVE
BUN SERPL-MCNC: 10 MG/DL (ref 6–24)
BUN/CREAT SERPL: 13 (ref 9–23)
CALCIUM SERPL-MCNC: 9 MG/DL (ref 8.7–10.2)
CASTS URNS QL MICRO: NORMAL /LPF
CHLORIDE SERPL-SCNC: 103 MMOL/L (ref 96–106)
CHOLEST SERPL-MCNC: 148 MG/DL (ref 100–199)
CO2 SERPL-SCNC: 23 MMOL/L (ref 20–29)
COLOR UR: YELLOW
CREAT SERPL-MCNC: 0.75 MG/DL (ref 0.57–1)
EGFRCR SERPLBLD CKD-EPI 2021: 99 ML/MIN/1.73
EOSINOPHIL # BLD AUTO: 0.1 X10E3/UL (ref 0–0.4)
EOSINOPHIL NFR BLD AUTO: 1 %
EPI CELLS #/AREA URNS HPF: NORMAL /HPF (ref 0–10)
ERYTHROCYTE [DISTWIDTH] IN BLOOD BY AUTOMATED COUNT: 13.5 % (ref 11.7–15.4)
GLOBULIN SER CALC-MCNC: 3.1 G/DL (ref 1.5–4.5)
GLUCOSE SERPL-MCNC: 90 MG/DL (ref 70–99)
GLUCOSE UR QL STRIP: NEGATIVE
HAV IGM SERPL QL IA: NEGATIVE
HBA1C MFR BLD: 6 % (ref 4.8–5.6)
HBV CORE IGM SERPL QL IA: NEGATIVE
HBV SURFACE AG SERPL QL IA: NEGATIVE
HCT VFR BLD AUTO: 36.7 % (ref 34–46.6)
HCV AB SERPL QL IA: NORMAL
HCV IGG SERPL QL IA: NON REACTIVE
HDLC SERPL-MCNC: 45 MG/DL
HGB BLD-MCNC: 11.8 G/DL (ref 11.1–15.9)
HGB UR QL STRIP: NEGATIVE
IMM GRANULOCYTES # BLD AUTO: 0 X10E3/UL (ref 0–0.1)
IMM GRANULOCYTES NFR BLD AUTO: 0 %
KETONES UR QL STRIP: NEGATIVE
LDLC SERPL CALC-MCNC: 87 MG/DL (ref 0–99)
LEUKOCYTE ESTERASE UR QL STRIP: NEGATIVE
LYMPHOCYTES # BLD AUTO: 2.9 X10E3/UL (ref 0.7–3.1)
LYMPHOCYTES NFR BLD AUTO: 35 %
MCH RBC QN AUTO: 27.7 PG (ref 26.6–33)
MCHC RBC AUTO-ENTMCNC: 32.2 G/DL (ref 31.5–35.7)
MCV RBC AUTO: 86 FL (ref 79–97)
MICRO URNS: NORMAL
MICRO URNS: NORMAL
MONOCYTES # BLD AUTO: 0.6 X10E3/UL (ref 0.1–0.9)
MONOCYTES NFR BLD AUTO: 7 %
NEUTROPHILS # BLD AUTO: 4.7 X10E3/UL (ref 1.4–7)
NEUTROPHILS NFR BLD AUTO: 56 %
NITRITE UR QL STRIP: NEGATIVE
PH UR STRIP: 6 [PH] (ref 5–7.5)
PLATELET # BLD AUTO: 436 X10E3/UL (ref 150–450)
POTASSIUM SERPL-SCNC: 4.3 MMOL/L (ref 3.5–5.2)
PROT SERPL-MCNC: 7 G/DL (ref 6–8.5)
PROT UR QL STRIP: NEGATIVE
RBC # BLD AUTO: 4.26 X10E6/UL (ref 3.77–5.28)
RBC #/AREA URNS HPF: NORMAL /HPF (ref 0–2)
SODIUM SERPL-SCNC: 140 MMOL/L (ref 134–144)
SP GR UR STRIP: 1.01 (ref 1–1.03)
T4 FREE SERPL-MCNC: 0.97 NG/DL (ref 0.82–1.77)
TRIGL SERPL-MCNC: 82 MG/DL (ref 0–149)
TSH SERPL DL<=0.005 MIU/L-ACNC: 2 UIU/ML (ref 0.45–4.5)
UROBILINOGEN UR STRIP-MCNC: 0.2 MG/DL (ref 0.2–1)
VLDLC SERPL CALC-MCNC: 16 MG/DL (ref 5–40)
WBC # BLD AUTO: 8.4 X10E3/UL (ref 3.4–10.8)
WBC #/AREA URNS HPF: NORMAL /HPF (ref 0–5)

## 2024-02-29 LAB
HIV1 RNA # SERPL NAA+PROBE: <20 COPIES/ML
HIV1 RNA SERPL NAA+PROBE-LOG#: NORMAL LOG10COPY/ML

## 2024-03-01 ENCOUNTER — TELEPHONE (OUTPATIENT)
Facility: CLINIC | Age: 49
End: 2024-03-01

## 2024-04-08 RX ORDER — DROSPIRENONE 4 MG/1
1 TABLET, FILM COATED ORAL DAILY
Qty: 84 TABLET | Refills: 1 | OUTPATIENT
Start: 2024-04-08

## 2024-05-03 ENCOUNTER — PREP FOR PROCEDURE (OUTPATIENT)
Age: 49
End: 2024-05-03

## 2024-05-03 ENCOUNTER — TELEPHONE (OUTPATIENT)
Age: 49
End: 2024-05-03

## 2024-05-03 DIAGNOSIS — Z12.11 ENCOUNTER FOR SCREENING FOR MALIGNANT NEOPLASM OF COLON: ICD-10-CM

## 2024-05-03 DIAGNOSIS — Z12.11 ENCOUNTER FOR SCREENING FOR MALIGNANT NEOPLASM OF COLON: Primary | ICD-10-CM

## 2024-05-03 RX ORDER — POLYETHYLENE GLYCOL 3350 17 G/17G
POWDER, FOR SOLUTION ORAL
Qty: 510 G | Refills: 0 | Status: SHIPPED | OUTPATIENT
Start: 2024-05-03

## 2024-05-03 NOTE — TELEPHONE ENCOUNTER
I SPOKE WITH ALEX, REVIEWED CS QUESTIONNAIRE. SHE IS OK TO SCHEDULE AND CHOSE 6-11-24 AT 9AM.  SEE PROCEDURE CHECKLIST.

## 2024-05-08 LAB
CHOLEST SERPL-MCNC: 129 MG/DL
GLUCOSE SERPL-MCNC: 93 MG/DL (ref 65–100)
HDLC SERPL-MCNC: 44 MG/DL
LDLC SERPL CALC-MCNC: 68.8 MG/DL (ref 0–100)
TRIGL SERPL-MCNC: 81 MG/DL

## 2024-05-09 ENCOUNTER — TELEPHONE (OUTPATIENT)
Facility: CLINIC | Age: 49
End: 2024-05-09

## 2024-05-09 DIAGNOSIS — I10 BENIGN ESSENTIAL HYPERTENSION: Primary | ICD-10-CM

## 2024-05-09 RX ORDER — AMLODIPINE BESYLATE 10 MG/1
10 TABLET ORAL DAILY
Qty: 90 TABLET | Refills: 1 | Status: SHIPPED | OUTPATIENT
Start: 2024-05-09

## 2024-05-09 NOTE — TELEPHONE ENCOUNTER
----- Message from Sabra Lovelace LPN sent at 5/9/2024 11:19 AM EDT -----  Regarding: FW: Amlodpine  Contact: [phone]    ----- Message -----  From: Lindsay Maddox \"Anna\"  Sent: 5/9/2024  11:12 AM EDT  To: [facility] [place] Clinical Staff  Subject: Amlodpine                                        I need a new prescription for my amlodpine. I think I've run out of refills from the last doctor. I use the [facility] in [place].

## 2024-05-22 ENCOUNTER — HOSPITAL ENCOUNTER (OUTPATIENT)
Facility: HOSPITAL | Age: 49
Discharge: HOME | End: 2024-05-25
Attending: OBSTETRICS & GYNECOLOGY
Payer: COMMERCIAL

## 2024-05-22 ENCOUNTER — TRANSCRIBE ORDERS (OUTPATIENT)
Facility: HOSPITAL | Age: 49
End: 2024-05-22

## 2024-05-22 ENCOUNTER — OFFICE VISIT (OUTPATIENT)
Age: 49
End: 2024-05-22
Payer: COMMERCIAL

## 2024-05-22 VITALS — DIASTOLIC BLOOD PRESSURE: 86 MMHG | WEIGHT: 293 LBS | BODY MASS INDEX: 54.21 KG/M2 | SYSTOLIC BLOOD PRESSURE: 131 MMHG

## 2024-05-22 VITALS — WEIGHT: 293 LBS | HEIGHT: 63 IN | BODY MASS INDEX: 51.91 KG/M2

## 2024-05-22 DIAGNOSIS — Z11.3 VENEREAL DISEASE SCREENING: ICD-10-CM

## 2024-05-22 DIAGNOSIS — Z12.31 VISIT FOR SCREENING MAMMOGRAM: Primary | ICD-10-CM

## 2024-05-22 DIAGNOSIS — Z12.31 VISIT FOR SCREENING MAMMOGRAM: ICD-10-CM

## 2024-05-22 DIAGNOSIS — Z01.419 ENCOUNTER FOR GYNECOLOGICAL EXAMINATION: Primary | ICD-10-CM

## 2024-05-22 PROCEDURE — 3075F SYST BP GE 130 - 139MM HG: CPT | Performed by: OBSTETRICS & GYNECOLOGY

## 2024-05-22 PROCEDURE — 77067 SCR MAMMO BI INCL CAD: CPT

## 2024-05-22 PROCEDURE — 3079F DIAST BP 80-89 MM HG: CPT | Performed by: OBSTETRICS & GYNECOLOGY

## 2024-05-22 PROCEDURE — 99396 PREV VISIT EST AGE 40-64: CPT | Performed by: OBSTETRICS & GYNECOLOGY

## 2024-05-22 NOTE — PROGRESS NOTES
[facility]  [phone]    Alice Jimenez MD, FACOG        Annual Gynecologic Exam:  Chief Complaint   Patient presents with    Annual Exam       Lindsay Maddox is a No obstetric history on file.,  48 y.o. female   No LMP recorded (lmp unknown). (Menstrual status: Chemically Induced).    The patient presents for her annual gynecologic checkup.     The patient is having no problems, happy with Slynd.      Per Rooming Note:  No LMP recorded (lmp unknown). (Menstrual status: Chemically Induced).  Her periods are moderate in flow and often irregular with no apparent pattern.  She does not have dysmenorrhea.  Problems: no problems; new partner, pt opted for std screening  Birth Control: oral progesterone-only contraceptive.  Last Pap: see report obtained 1 year(s) ago.  She does not have a history of ROYA 2, 3 or cervical cancer.   Last Mammogram: has not had a recent mammogram.  It was see report. Last mammo 10/5/2022  Last colonoscopy: scheduled for 2024    Sexual history and Contraception:    Social History     Substance and Sexual Activity   Sexual Activity Not on file      Past Medical History:   Diagnosis Date    Bursitis     Hypertension     Migraine     Prediabetes      Current Outpatient Medications   Medication Sig Dispense Refill    amLODIPine (NORVASC) 10 MG tablet Take 1 tablet by mouth daily 90 tablet 1    polyethylene glycol (GLYCOLAX) 17 GM/SCOOP powder Use as instructed by physician for colonoscopy prep 510 g 0    topiramate (TOPAMAX) 50 MG tablet Take 0.5 tab by mouth once daily for 6 days then take 1 tab by mouth once daily 27 tablet 0    diclofenac sodium (VOLTAREN) 1 % GEL Apply 4 g topically 4 times daily 2 g 0    Drospirenone 4 MG TABS Take 1 tablet by mouth daily 84 tablet 1    aspirin-acetaminophen-caffeine (EXCEDRIN MIGRAINE) 250-250-65 MG per tablet Take 1 tablet by mouth       No current facility-administered medications for this visit.      OB History

## 2024-05-22 NOTE — PROGRESS NOTES
Lindsay Maddox is a 48 y.o. female returns for an annual exam     Chief Complaint   Patient presents with    Annual Exam       No LMP recorded (lmp unknown). (Menstrual status: Chemically Induced).  Her periods are moderate in flow and often irregular with no apparent pattern.  She does not have dysmenorrhea.  Problems: no problems; new partner, pt opted for std screening  Birth Control: oral progesterone-only contraceptive.  Last Pap: see report obtained 1 year(s) ago.  She does not have a history of ROYA 2, 3 or cervical cancer.   Last Mammogram: has not had a recent mammogram.  It was see report. Last mammo 10/5/2022  Last colonoscopy: scheduled for 6/2024      1. Have you been to the ER, urgent care clinic, or hospitalized since your last visit? No    2. Have you seen or consulted any other health care providers outside of the [facility] since your last visit? No    Examination chaperoned by Mame Sam LPN.

## 2024-05-25 LAB
C TRACH RRNA SPEC QL NAA+PROBE: NEGATIVE
N GONORRHOEA RRNA SPEC QL NAA+PROBE: NEGATIVE
T VAGINALIS RRNA SPEC QL NAA+PROBE: NEGATIVE

## 2024-05-28 ENCOUNTER — OFFICE VISIT (OUTPATIENT)
Facility: CLINIC | Age: 49
End: 2024-05-28
Payer: COMMERCIAL

## 2024-05-28 ENCOUNTER — TELEPHONE (OUTPATIENT)
Facility: CLINIC | Age: 49
End: 2024-05-28

## 2024-05-28 VITALS
SYSTOLIC BLOOD PRESSURE: 119 MMHG | BODY MASS INDEX: 51.91 KG/M2 | DIASTOLIC BLOOD PRESSURE: 73 MMHG | RESPIRATION RATE: 20 BRPM | HEIGHT: 63 IN | OXYGEN SATURATION: 100 % | TEMPERATURE: 98 F | WEIGHT: 293 LBS | HEART RATE: 77 BPM

## 2024-05-28 DIAGNOSIS — R73.03 PREDIABETES: Primary | ICD-10-CM

## 2024-05-28 DIAGNOSIS — G43.009 MIGRAINE WITHOUT AURA AND WITHOUT STATUS MIGRAINOSUS, NOT INTRACTABLE: ICD-10-CM

## 2024-05-28 DIAGNOSIS — G47.33 OSA (OBSTRUCTIVE SLEEP APNEA): ICD-10-CM

## 2024-05-28 DIAGNOSIS — I10 BENIGN ESSENTIAL HYPERTENSION: ICD-10-CM

## 2024-05-28 DIAGNOSIS — E66.01 MORBID OBESITY WITH BMI OF 40.0-44.9, ADULT (HCC): ICD-10-CM

## 2024-05-28 DIAGNOSIS — F33.41 RECURRENT MAJOR DEPRESSIVE DISORDER, IN PARTIAL REMISSION (HCC): ICD-10-CM

## 2024-05-28 DIAGNOSIS — G43.709 CHRONIC MIGRAINE WITHOUT AURA WITHOUT STATUS MIGRAINOSUS, NOT INTRACTABLE: ICD-10-CM

## 2024-05-28 LAB — HBA1C MFR BLD: 5.8 %

## 2024-05-28 PROCEDURE — 3078F DIAST BP <80 MM HG: CPT

## 2024-05-28 PROCEDURE — 99214 OFFICE O/P EST MOD 30 MIN: CPT

## 2024-05-28 PROCEDURE — 83036 HEMOGLOBIN GLYCOSYLATED A1C: CPT

## 2024-05-28 PROCEDURE — 3074F SYST BP LT 130 MM HG: CPT

## 2024-05-28 RX ORDER — TOPIRAMATE 50 MG/1
50 TABLET, FILM COATED ORAL DAILY
Qty: 30 TABLET | Refills: 2 | Status: SHIPPED | OUTPATIENT
Start: 2024-05-28

## 2024-05-28 RX ORDER — BUPROPION HYDROCHLORIDE 150 MG/1
150 TABLET ORAL EVERY MORNING
COMMUNITY
Start: 2024-04-18

## 2024-05-28 RX ORDER — TOPIRAMATE 50 MG/1
50 TABLET, FILM COATED ORAL DAILY
Qty: 30 TABLET | Refills: 2 | Status: SHIPPED | OUTPATIENT
Start: 2024-05-28 | End: 2024-05-28

## 2024-05-28 ASSESSMENT — ENCOUNTER SYMPTOMS
RHINORRHEA: 0
VOMITING: 0
DIARRHEA: 0
SORE THROAT: 0
BACK PAIN: 0
BLOOD IN STOOL: 0
SINUS PAIN: 0
EYE PAIN: 0
NAUSEA: 0
ABDOMINAL PAIN: 0
CONSTIPATION: 0

## 2024-05-28 ASSESSMENT — PATIENT HEALTH QUESTIONNAIRE - PHQ9
SUM OF ALL RESPONSES TO PHQ QUESTIONS 1-9: 0
2. FEELING DOWN, DEPRESSED OR HOPELESS: NOT AT ALL
SUM OF ALL RESPONSES TO PHQ QUESTIONS 1-9: 0
SUM OF ALL RESPONSES TO PHQ9 QUESTIONS 1 & 2: 0
SUM OF ALL RESPONSES TO PHQ QUESTIONS 1-9: 0
1. LITTLE INTEREST OR PLEASURE IN DOING THINGS: NOT AT ALL
SUM OF ALL RESPONSES TO PHQ QUESTIONS 1-9: 0

## 2024-05-28 NOTE — H&P (VIEW-ONLY)
Musculoskeletal:  Negative for arthralgias, back pain, gait problem, joint swelling and myalgias.   Skin: Negative.    Neurological:  Negative for dizziness, syncope, speech difficulty, light-headedness, numbness and headaches.   Psychiatric/Behavioral:  Negative for agitation, behavioral problems, confusion, decreased concentration, dysphoric mood, hallucinations, self-injury, sleep disturbance and suicidal ideas. The patient is not nervous/anxious and is not hyperactive.          /73 (Site: Left Upper Arm, Position: Sitting, Cuff Size: Large Adult)   Pulse 77   Temp 98 °F (36.7 °C) (Oral)   Resp 20   Ht 1.6 m (5' 3\")   Wt (!) 139.3 kg (307 lb)   LMP  (LMP Unknown)   SpO2 100%   BMI 54.38 kg/m²     Hemoglobin A1C   Date Value Ref Range Status   02/27/2024 6.0 (H) 4.8 - 5.6 % Final     Comment:                 Prediabetes: 5.7 - 6.4           Diabetes: >6.4           Glycemic control for adults with diabetes: <7.0       Hemoglobin A1C, POC   Date Value Ref Range Status   05/28/2024 5.8 % Final         Physical Exam  Constitutional:       Appearance: Normal appearance. She is obese.   HENT:      Head: Normocephalic and atraumatic.   Cardiovascular:      Rate and Rhythm: Normal rate and regular rhythm.      Heart sounds: Normal heart sounds.   Pulmonary:      Effort: Pulmonary effort is normal.      Breath sounds: Normal breath sounds.   Abdominal:      General: Abdomen is flat. Bowel sounds are normal.      Palpations: Abdomen is soft.   Skin:     General: Skin is warm and dry.   Neurological:      General: No focal deficit present.      Mental Status: She is alert and oriented to person, place, and time.   Psychiatric:         Mood and Affect: Mood normal.         Behavior: Behavior normal.         Thought Content: Thought content normal.         Judgment: Judgment normal.          1. Prediabetes  -     AMB POC HEMOGLOBIN A1C  Hemoglobin A1C   Date Value Ref Range Status   02/27/2024 6.0 (H) 4.8 -

## 2024-05-28 NOTE — PROGRESS NOTES
No chief complaint on file.    \"Have you been to the ER, urgent care clinic since your last visit?  Hospitalized since your last visit?\"    NO    “Have you seen or consulted any other health care providers outside of [facility] since your last visit?”    NO        “Have you had a colorectal cancer screening such as a colonoscopy/FIT/Cologuard?    NO    No colonoscopy on file  No cologuard on file  No FIT/FOBT on file   No flexible sigmoidoscopy on file         Click Here for Release of Records Request

## 2024-05-28 NOTE — PROGRESS NOTES
Lindsay Maddox is a 48 y.o. female who presents to the office today for the following:    Chief Complaint   Patient presents with    Follow-up    Hypertension    Depression    Other     prediabetes       Past Medical History:   Diagnosis Date    Bursitis     H/O mammogram 05/22/2024    low risk    Hypertension     Migraine     Prediabetes         Past Surgical History:   Procedure Laterality Date    CHOLECYSTECTOMY      ENDOMETRIAL CRYOABLATION Bilateral 2014    GYN          Family History   Problem Relation Age of Onset    Lupus Maternal Grandmother     Hypertension Maternal Grandfather     Diabetes Maternal Grandfather     Cancer Maternal Grandmother     Osteoarthritis Maternal Grandmother     Cancer Maternal Aunt     No Known Problems Brother     Diabetes Mother     Cancer Mother     Hypertension Mother         Social History     Tobacco Use    Smoking status: Never    Smokeless tobacco: Never   Vaping Use    Vaping Use: Some days    Substances: Flavoring   Substance Use Topics    Alcohol use: Yes    Drug use: Never        HPI  Patient here for 3 month follow up of chronic conditions with PMH HTN, obesity, BRANDEE on CPAP, depression, migraines and prediabetes . Colonoscopy is scheduled for 6/11/2024. PAP up to date since 4/2023 WNL. Mammogram 4/2024. Follows with Anastasia, Sleep Medicine and REGIS Jimenez. States she is supposed to use CPAP but mask is poor fitting. She is following closely with Anastasia. She is still waiting to hear back about new mask for machine due to poor fit. She is working on weight loss thru exercise and diet. She has lost 16 lbs since our last visit. She is not taking topiramate daily but does feel it is helping. Endorses 2 migraines over the past month. Overall doing well with no complaints.     Chief Complaint   Patient presents with    Follow-up    Hypertension    Depression    Other     prediabetes       Current Outpatient Medications on File Prior to Visit   Medication Sig Dispense

## 2024-06-02 PROBLEM — Z12.11 ENCOUNTER FOR SCREENING FOR MALIGNANT NEOPLASM OF COLON: Status: RESOLVED | Noted: 2024-05-03 | Resolved: 2024-06-02

## 2024-06-11 ENCOUNTER — ANESTHESIA EVENT (OUTPATIENT)
Facility: HOSPITAL | Age: 49
End: 2024-06-11
Payer: COMMERCIAL

## 2024-06-11 ENCOUNTER — ANESTHESIA (OUTPATIENT)
Facility: HOSPITAL | Age: 49
End: 2024-06-11
Payer: COMMERCIAL

## 2024-06-11 ENCOUNTER — HOSPITAL ENCOUNTER (OUTPATIENT)
Facility: HOSPITAL | Age: 49
Setting detail: OUTPATIENT SURGERY
Discharge: HOME | End: 2024-06-11
Attending: INTERNAL MEDICINE | Admitting: INTERNAL MEDICINE
Payer: COMMERCIAL

## 2024-06-11 VITALS
HEIGHT: 65 IN | OXYGEN SATURATION: 99 % | TEMPERATURE: 97.5 F | DIASTOLIC BLOOD PRESSURE: 82 MMHG | HEART RATE: 84 BPM | BODY MASS INDEX: 48.82 KG/M2 | RESPIRATION RATE: 18 BRPM | SYSTOLIC BLOOD PRESSURE: 122 MMHG | WEIGHT: 293 LBS

## 2024-06-11 PROCEDURE — 2580000003 HC RX 258: Performed by: INTERNAL MEDICINE

## 2024-06-11 PROCEDURE — 6360000002 HC RX W HCPCS: Performed by: NURSE ANESTHETIST, CERTIFIED REGISTERED

## 2024-06-11 PROCEDURE — 3700000001 HC ADD 15 MINUTES (ANESTHESIA): Performed by: INTERNAL MEDICINE

## 2024-06-11 PROCEDURE — 2500000003 HC RX 250 WO HCPCS: Performed by: NURSE ANESTHETIST, CERTIFIED REGISTERED

## 2024-06-11 PROCEDURE — 45378 DIAGNOSTIC COLONOSCOPY: CPT | Performed by: INTERNAL MEDICINE

## 2024-06-11 PROCEDURE — 7100000011 HC PHASE II RECOVERY - ADDTL 15 MIN: Performed by: INTERNAL MEDICINE

## 2024-06-11 PROCEDURE — 3700000000 HC ANESTHESIA ATTENDED CARE: Performed by: INTERNAL MEDICINE

## 2024-06-11 PROCEDURE — 2709999900 HC NON-CHARGEABLE SUPPLY: Performed by: INTERNAL MEDICINE

## 2024-06-11 PROCEDURE — 3600007502: Performed by: INTERNAL MEDICINE

## 2024-06-11 PROCEDURE — 7100000010 HC PHASE II RECOVERY - FIRST 15 MIN: Performed by: INTERNAL MEDICINE

## 2024-06-11 PROCEDURE — 3600007512: Performed by: INTERNAL MEDICINE

## 2024-06-11 RX ORDER — LIDOCAINE HYDROCHLORIDE 20 MG/ML
INJECTION, SOLUTION EPIDURAL; INFILTRATION; INTRACAUDAL; PERINEURAL PRN
Status: DISCONTINUED | OUTPATIENT
Start: 2024-06-11 | End: 2024-06-11 | Stop reason: SDUPTHER

## 2024-06-11 RX ORDER — KETAMINE HCL IN NACL, ISO-OSM 100MG/10ML
SYRINGE (ML) INJECTION PRN
Status: DISCONTINUED | OUTPATIENT
Start: 2024-06-11 | End: 2024-06-11 | Stop reason: SDUPTHER

## 2024-06-11 RX ORDER — PROPOFOL 10 MG/ML
INJECTION, EMULSION INTRAVENOUS PRN
Status: DISCONTINUED | OUTPATIENT
Start: 2024-06-11 | End: 2024-06-11 | Stop reason: SDUPTHER

## 2024-06-11 RX ORDER — SODIUM CHLORIDE 9 MG/ML
INJECTION, SOLUTION INTRAVENOUS CONTINUOUS
Status: DISCONTINUED | OUTPATIENT
Start: 2024-06-11 | End: 2024-06-11 | Stop reason: HOSPADM

## 2024-06-11 RX ORDER — SODIUM CHLORIDE 9 MG/ML
25 INJECTION, SOLUTION INTRAVENOUS PRN
Status: DISCONTINUED | OUTPATIENT
Start: 2024-06-11 | End: 2024-06-11 | Stop reason: HOSPADM

## 2024-06-11 RX ORDER — GLYCOPYRROLATE 0.2 MG/ML
INJECTION INTRAMUSCULAR; INTRAVENOUS PRN
Status: DISCONTINUED | OUTPATIENT
Start: 2024-06-11 | End: 2024-06-11 | Stop reason: SDUPTHER

## 2024-06-11 RX ADMIN — PROPOFOL 50 MG: 10 INJECTION, EMULSION INTRAVENOUS at 11:30

## 2024-06-11 RX ADMIN — PROPOFOL 100 MG: 10 INJECTION, EMULSION INTRAVENOUS at 11:22

## 2024-06-11 RX ADMIN — PROPOFOL 50 MG: 10 INJECTION, EMULSION INTRAVENOUS at 11:27

## 2024-06-11 RX ADMIN — LIDOCAINE HYDROCHLORIDE 60 MG: 20 INJECTION, SOLUTION EPIDURAL; INFILTRATION; INTRACAUDAL; PERINEURAL at 11:12

## 2024-06-11 RX ADMIN — Medication 30 MG: at 11:24

## 2024-06-11 RX ADMIN — GLYCOPYRROLATE 0.2 MG: 0.2 INJECTION INTRAMUSCULAR; INTRAVENOUS at 11:12

## 2024-06-11 RX ADMIN — SODIUM CHLORIDE: 9 INJECTION, SOLUTION INTRAVENOUS at 10:14

## 2024-06-11 RX ADMIN — Medication 20 MG: at 11:25

## 2024-06-11 RX ADMIN — PROPOFOL 50 MG: 10 INJECTION, EMULSION INTRAVENOUS at 11:25

## 2024-06-11 ASSESSMENT — PAIN - FUNCTIONAL ASSESSMENT
PAIN_FUNCTIONAL_ASSESSMENT: 0-10

## 2024-06-11 NOTE — OP NOTE
Colonoscopy Procedure Note      Patient: Lindsay Maddox MRN: [id number]  SSN: [id number]    YOB: 1975  Age: 48 y.o.  Sex: female      Date of Procedure: 6/11/2024  Date/Time:  6/11/2024 11:43 AM       IMPRESSION:     1.  Normal colon to level of cecum         RECOMMENDATIONS:     1) Repeat colonoscopy in 10 years.         INDICATION: Colon screening    PROCEDURE PERFORMED: Colonoscopy     DESCRIPTION OF PROCEDURE: An informed consent was obtained.  The patient was placed in left lateral position.  Perianal inspection and a digital rectal exam was performed.  Video colonoscope was introduced into the rectum and advanced under direct vision up to the terminal ileum.  With adequate insufflation and maneuvering of the withdrawing scope, the colonic mucosa was visualized carefully.  Retroflexion was performed in the rectum to see the anorectum and also in the ascending colon to look behind the folds.  Vital signs, pulse oximetry, single lead cardiac monitor were monitored throughout the procedure as the sedation was titrated to the desired effect ensuring patient comfort and safety.  The patient tolerated the procedure very well and was transferred to the recovery area. Following is the summary of findings: No mucosal lesions were noted to the level of the cecum.        ENDOSCOPIST: Jacqueline Taylor Jr, MD     ENDOSCOPE: Olympus video colonoscope     ASSISTANT:Circulator: Noman Cho RN              Scrub Person First: Tatiana Zavaleta     ANESTHESIA: TIVA      QUALITY OF PREPARATION:Good      FINDINGS:   Normal colon to level of cecum        Complication:  None         EBL: None     SPECIMENS: * No specimens in log *          Jacqueline Taylor Jr, MD  June 11, 2024  11:43 AM

## 2024-06-11 NOTE — DISCHARGE INSTRUCTIONS
For the next 12 hours you should not:   1. drive   2. drink alcohol   3. operate any machinery   4. engage in activities that require mental sharpness or manual dexterity such as     cooking   5. take any drugs other than those prescribed by a physician   6. make any legal or financial decisions    Call your doctor's office immediately, if there is is anything unusual:   1. increased and continuing rectal bleeding   2. fever   3. Unusual abdominal pain    Take it easy today and resume normal activity tomorrow.It is common to have gas and mild bloating for a few hours. Pain is NOT normal. You may be groggy off and on for a few hours.    Resume previous diet.        Jacqueline Taylor Jr, MD  6/11/2024  11:46 AM

## 2024-06-11 NOTE — ANESTHESIA POSTPROCEDURE EVALUATION
Department of Anesthesiology  Postprocedure Note    Patient: Lindsay Maddox  MRN: [id number]  YOB: 1975  Date of evaluation: 6/11/2024    Procedure Summary       Date: 06/11/24 Room / Location: [facility] ENDO 01 / [facility] END[facility]COPY    Anesthesia Start: 1112 Anesthesia Stop: 1145    Procedure: COLONOSCOPY (Anus) Diagnosis:       Encounter for screening for malignant neoplasm of colon      (Encounter for screening for malignant neoplasm of colon [Z12.11])    Surgeons: Jacqueline Taylor Jr., MD Responsible Provider: Mk Castillo APRN - CRNA    Anesthesia Type: MAC ASA Status: 3            Anesthesia Type: MAC    Viktoriya Phase I: Viktoriya Score: 10    Viktoriya Phase II:      Anesthesia Post Evaluation    Patient location during evaluation: bedside  Patient participation: complete - patient participated  Level of consciousness: sleepy but conscious  Pain score: 0  Airway patency: patent  Nausea & Vomiting: no vomiting and no nausea  Cardiovascular status: blood pressure returned to baseline  Respiratory status: room air  Hydration status: stable  Multimodal analgesia pain management approach    No notable events documented.

## 2024-06-11 NOTE — INTERVAL H&P NOTE
Update History & Physical    The patient's History and Physical of June 11, 2024 was reviewed with the patient and I examined the patient. There was no change. The surgical site was confirmed by the patient and me.     Plan: The risks, benefits, expected outcome, and alternative to the recommended procedure have been discussed with the patient. Patient understands and wants to proceed with the procedure.     Electronically signed by Jacqueline Taylor Jr, MD on 6/11/2024 at 9:45 AM

## 2024-06-11 NOTE — ANESTHESIA PRE PROCEDURE
Department of Anesthesiology  Preprocedure Note       Name:  Lindsay CAMPOS Varsha   Age:  48 y.o.  :  1975                                          MRN:  [id number]         Date:  2024      Surgeon: Surgeon(s):  Jacqueline Taylor Jr., MD    Procedure: Procedure(s):  COLONOSCOPY    Medications prior to admission:   Prior to Admission medications    Medication Sig Start Date End Date Taking? Authorizing Provider   buPROPion (WELLBUTRIN XL) 150 MG extended release tablet Take 1 tablet by mouth every morning 24   Provider, MD Pepe   topiramate (TOPAMAX) 50 MG tablet Take 1 tablet by mouth daily 24   Caterina Hill APRN - CNP   Drospirenone 4 MG TABS Take 1 tablet by mouth daily 24   Alice Jimenez MD   amLODIPine (NORVASC) 10 MG tablet Take 1 tablet by mouth daily 24   Caterina Hill APRN - CNP   polyethylene glycol (GLYCOLAX) 17 GM/SCOOP powder Use as instructed by physician for colonoscopy prep 5/3/24   Jacqueline Taylor Jr., MD   diclofenac sodium (VOLTAREN) 1 % GEL Apply 4 g topically 4 times daily 24   Edmond Claudio MD   aspirin-acetaminophen-caffeine (EXCEDRIN MIGRAINE) 250-250-65 MG per tablet Take 1 tablet by mouth    Automatic Reconciliation, Ar       Current medications:    No current facility-administered medications for this encounter.       Allergies:    Allergies   Allergen Reactions   • Azithromycin Hives   • Penicillins Hives   • Sulfa Antibiotics Hives   • Sulfamethoxazole-Trimethoprim Hives       Problem List:    Patient Active Problem List   Diagnosis Code   • Benign essential hypertension I10   • Major depressive disorder F32.9   • Migraine G43.909   • BRANDEE (obstructive sleep apnea) G47.33   • Prediabetes R73.03       Past Medical History:        Diagnosis Date   • Bursitis    • H/O mammogram 2024    low risk   • Hypertension    • Migraine    • Prediabetes        Past Surgical History:        Procedure Laterality Date   • CHOLECYSTECTOMY     •

## 2024-10-30 DIAGNOSIS — I10 BENIGN ESSENTIAL HYPERTENSION: ICD-10-CM

## 2024-10-30 RX ORDER — AMLODIPINE BESYLATE 10 MG/1
10 TABLET ORAL DAILY
Qty: 90 TABLET | Refills: 0 | Status: SHIPPED | OUTPATIENT
Start: 2024-10-30

## 2025-02-01 DIAGNOSIS — I10 BENIGN ESSENTIAL HYPERTENSION: ICD-10-CM

## 2025-02-03 ENCOUNTER — ANESTHESIA EVENT (OUTPATIENT)
Facility: HOSPITAL | Age: 50
End: 2025-02-03
Payer: COMMERCIAL

## 2025-02-03 ENCOUNTER — ANESTHESIA (OUTPATIENT)
Facility: HOSPITAL | Age: 50
End: 2025-02-03
Payer: COMMERCIAL

## 2025-02-03 ENCOUNTER — HOSPITAL ENCOUNTER (OUTPATIENT)
Facility: HOSPITAL | Age: 50
Setting detail: OUTPATIENT SURGERY
Discharge: HOME | End: 2025-02-03
Attending: INTERNAL MEDICINE | Admitting: INTERNAL MEDICINE
Payer: COMMERCIAL

## 2025-02-03 VITALS
HEIGHT: 64 IN | OXYGEN SATURATION: 99 % | TEMPERATURE: 97.7 F | DIASTOLIC BLOOD PRESSURE: 91 MMHG | HEART RATE: 81 BPM | WEIGHT: 293 LBS | BODY MASS INDEX: 50.02 KG/M2 | RESPIRATION RATE: 16 BRPM | SYSTOLIC BLOOD PRESSURE: 130 MMHG

## 2025-02-03 PROCEDURE — 3600007502: Performed by: INTERNAL MEDICINE

## 2025-02-03 PROCEDURE — 88305 TISSUE EXAM BY PATHOLOGIST: CPT

## 2025-02-03 PROCEDURE — 2709999900 HC NON-CHARGEABLE SUPPLY: Performed by: INTERNAL MEDICINE

## 2025-02-03 PROCEDURE — 2580000003 HC RX 258: Performed by: INTERNAL MEDICINE

## 2025-02-03 PROCEDURE — 7100000011 HC PHASE II RECOVERY - ADDTL 15 MIN: Performed by: INTERNAL MEDICINE

## 2025-02-03 PROCEDURE — 6360000002 HC RX W HCPCS

## 2025-02-03 PROCEDURE — 3700000000 HC ANESTHESIA ATTENDED CARE: Performed by: INTERNAL MEDICINE

## 2025-02-03 PROCEDURE — 7100000010 HC PHASE II RECOVERY - FIRST 15 MIN: Performed by: INTERNAL MEDICINE

## 2025-02-03 RX ORDER — SODIUM CHLORIDE 0.9 % (FLUSH) 0.9 %
5-40 SYRINGE (ML) INJECTION EVERY 12 HOURS SCHEDULED
Status: CANCELLED | OUTPATIENT
Start: 2025-02-03

## 2025-02-03 RX ORDER — LIDOCAINE HYDROCHLORIDE 20 MG/ML
INJECTION, SOLUTION EPIDURAL; INFILTRATION; INTRACAUDAL; PERINEURAL
Status: DISCONTINUED | OUTPATIENT
Start: 2025-02-03 | End: 2025-02-03 | Stop reason: SDUPTHER

## 2025-02-03 RX ORDER — AMLODIPINE BESYLATE 10 MG/1
10 TABLET ORAL DAILY
Qty: 90 TABLET | Refills: 0 | Status: SHIPPED | OUTPATIENT
Start: 2025-02-03

## 2025-02-03 RX ORDER — SODIUM CHLORIDE 9 MG/ML
INJECTION, SOLUTION INTRAVENOUS CONTINUOUS
Status: DISCONTINUED | OUTPATIENT
Start: 2025-02-03 | End: 2025-02-03 | Stop reason: HOSPADM

## 2025-02-03 RX ORDER — SODIUM CHLORIDE 9 MG/ML
INJECTION, SOLUTION INTRAVENOUS PRN
Status: DISCONTINUED | OUTPATIENT
Start: 2025-02-03 | End: 2025-02-03 | Stop reason: HOSPADM

## 2025-02-03 RX ORDER — SODIUM CHLORIDE 0.9 % (FLUSH) 0.9 %
5-40 SYRINGE (ML) INJECTION PRN
Status: CANCELLED | OUTPATIENT
Start: 2025-02-03

## 2025-02-03 RX ADMIN — SODIUM CHLORIDE: 9 INJECTION, SOLUTION INTRAVENOUS at 10:58

## 2025-02-03 RX ADMIN — PROPOFOL 100 MG: 10 INJECTION, EMULSION INTRAVENOUS at 11:00

## 2025-02-03 RX ADMIN — PROPOFOL 100 MG: 10 INJECTION, EMULSION INTRAVENOUS at 11:01

## 2025-02-03 RX ADMIN — LIDOCAINE HYDROCHLORIDE 100 MG: 20 INJECTION, SOLUTION EPIDURAL; INFILTRATION; INTRACAUDAL; PERINEURAL at 11:00

## 2025-02-03 NOTE — H&P
[facility]  [address]  [place] [address] [phone]                                History and Physical     NAME: Lindsay Maddox   :  1975   MRN:  [id number]     HPI:  The patient was seen and examined.    Past Surgical History:   Procedure Laterality Date    CHOLECYSTECTOMY      COLONOSCOPY N/A 2024    COLONOSCOPY performed by Jacqueline Taylor Jr., MD at [facility] ENDOSCOPY    ENDOMETRIAL CRYOABLATION Bilateral 2014    GYN       Past Medical History:   Diagnosis Date    Bursitis     H/O mammogram 2024    low risk    Hypertension     Migraine     Prediabetes      Social History     Tobacco Use    Smoking status: Never    Smokeless tobacco: Never   Vaping Use    Vaping status: Former    Substances: Flavoring   Substance Use Topics    Alcohol use: Yes     Comment: occassionally    Drug use: Never     Allergies   Allergen Reactions    Azithromycin Hives    Penicillins Hives    Sulfa Antibiotics Hives    Sulfamethoxazole-Trimethoprim Hives     Family History   Problem Relation Age of Onset    Diabetes Mother     Cancer Mother     Hypertension Mother     No Known Problems Brother     Cancer Maternal Aunt     Lupus Maternal Grandmother     Cancer Maternal Grandmother     Osteoarthritis Maternal Grandmother     Hypertension Maternal Grandfather     Diabetes Maternal Grandfather      No current facility-administered medications for this encounter.     Current Outpatient Medications   Medication Sig    amLODIPine (NORVASC) 10 MG tablet Take 1 tablet by mouth once daily    buPROPion (WELLBUTRIN XL) 150 MG extended release tablet Take 1 tablet by mouth every morning    topiramate (TOPAMAX) 50 MG tablet Take 1 tablet by mouth daily    Drospirenone 4 MG TABS Take 1 tablet by mouth daily    diclofenac sodium (VOLTAREN) 1 % GEL Apply 4 g topically 4 times daily    aspirin-acetaminophen-caffeine (EXCEDRIN MIGRAINE) 250-250-65 MG per tablet Take 1 tablet by mouth

## 2025-02-03 NOTE — ANESTHESIA PRE PROCEDURE
Department of Anesthesiology  Preprocedure Note       Name:  Lindsay CAMPOS Varsha   Age:  49 y.o.  :  1975                                          MRN:  [id number]         Date:  2/3/2025      Surgeon: Surgeon(s):  Bubba Vale MD    Procedure: Procedure(s):  ESOPHAGOGASTRODUODENOSCOPY    Medications prior to admission:   Prior to Admission medications    Medication Sig Start Date End Date Taking? Authorizing Provider   amLODIPine (NORVASC) 10 MG tablet Take 1 tablet by mouth once daily 10/30/24  Yes Caterina Hill APRN - CNP   buPROPion (WELLBUTRIN XL) 150 MG extended release tablet Take 1 tablet by mouth every morning 24  Yes Provider, MD Pepe   topiramate (TOPAMAX) 50 MG tablet Take 1 tablet by mouth daily 24  Yes Caterina Hill APRN - CNP   Drospirenone 4 MG TABS Take 1 tablet by mouth daily 24  Yes Alice Jimenez MD   aspirin-acetaminophen-caffeine (EXCEDRIN MIGRAINE) 250-250-65 MG per tablet Take 1 tablet by mouth   Yes Automatic Reconciliation, Ar       Current medications:    No current facility-administered medications for this encounter.       Allergies:    Allergies   Allergen Reactions   • Azithromycin Hives   • Penicillins Hives   • Sulfa Antibiotics Hives       Problem List:    Patient Active Problem List   Diagnosis Code   • Benign essential hypertension I10   • Major depressive disorder F32.9   • Migraine G43.909   • BRANDEE (obstructive sleep apnea) G47.33   • Prediabetes R73.03       Past Medical History:        Diagnosis Date   • Bursitis    • H/O mammogram 2024    low risk   • Hypertension    • Migraine    • Osteoarthritis     left knee   • Prediabetes        Past Surgical History:        Procedure Laterality Date   • CHOLECYSTECTOMY     • COLONOSCOPY N/A 2024    COLONOSCOPY performed by Jacqueline Taylor Jr., MD at [facility] ENDOSCOPY   • ENDOMETRIAL CRYOABLATION Bilateral        Social History:    Social History     Tobacco Use   • Smoking status:

## 2025-02-03 NOTE — DISCHARGE INSTRUCTIONS
[facility]  [address]  [place], [place] [id number] [phone]                     DISCHARGE INSTRUCTIONS    Lindsay Maddox  [id number]  1975    DISCOMFORT:  Sore throat- throat lozenges or warm salt water gargle  redness at IV site- apply warm compress to area; if redness or soreness persist- contact your physician  Gaseous discomfort- walking, belching will help relieve any discomfort    DIET  You may eat and drink after you leave.  You may resume your regular diet - however -  remember your colon is empty and a heavy meal will produce gas.   Avoid these foods:  vegetables, fried / greasy foods, carbonated drinks   You may not drink alcoholic beverages for at least 12 hours    ACTIVITY  You may resume your normal daily activities   Spend the remainder of the day resting -  avoid any strenuous activity.  You may not operate a vehicle for 12 hours  You may not engage in an occupation involving machinery or appliances for rest of today  Avoid making any critical decisions for at least 24 hour    CALL M.D.  ANY SIGN OF   Increasing pain, nausea, vomiting  Abdominal distension (swelling)  New increased bleeding (oral or rectal)  Fever (chills)  Pain in chest area  Bloody discharge from nose or mouth  Shortness of breath    Follow-up Instructions:   Call Dr. Vale for any questions or problems.     If we took a biopsy please call the office within 2 weeks to discuss your pathology results. Telephone # [phone]       ENDOSCOPY FINDINGS:   Mild gastropathy     Post-procedure recommendations:   -Await pathology., -Follow symptoms., -Low fat diet.  GERD diet: avoid fried and fatty foods. peppermint, chocolate, alcohol, coffee, citrus fruits and juices, tomoato products; avoid lying down for 2 to 3 hours after eating.  -Continue current medications  -Follow up with Dr Fish      Learning About Coronavirus (COVID-19)  Coronavirus (COVID-19): Overview  What is coronavirus

## 2025-02-03 NOTE — OP NOTE
[facility]  [address]  [place] [address]  [phone]                            NAME:  Lindsay Maddox   :   1975   MRN:   [id number]     Date/Time:  2/3/2025 11:07 AM    Esophagogastroduodenoscopy (EGD) Procedure Note    :  Bubba Vale MD    Staff: Circulator: Lis Fuentes RN  Endoscopy Technician: Neeraj Campos     Implants: none    Referring Provider:  Hetal Rodarte MD    Anethesia/Sedation:  MAC anesthesia    Procedure Details     After infom consent was obtained for the procedure, with all risks and benefits of procedure explained the patient was taken to the endoscopy suite and placed in the left lateral decubitus position.  Following sequential administration of sedation as per above, the [id number] gastroscope was inserted into the mouth and advanced under direct vision to second portion of the duodenum.  A careful inspection was made as the gastroscope was withdrawn, including a retroflexed view of the proximal stomach; findings and interventions are described below.      Findings:  Esophagus: Normal mucosa esophagus.  Z-line was noted at 40 cm.  Gastroesophageal flap valve was Hill grade 1  Stomach:mild erythema was noted in  body, antrum  Duodenum/jejunum:normal      Therapies:  biopsy of stomach body, antrum    Specimens:  ID Type Source Tests Collected by Time Destination   1 : gastric biopsy Tissue Stomach SURGICAL PATHOLOGY Bubba Vale MD 2/3/2025 1104               EBL: None    Complications:   None; patient tolerated the procedure well.           Impression:    See Postoperative diagnosis above    Recommendations:  -Await pathology., -Follow symptoms., -Low fat diet.  GERD diet: avoid fried and fatty foods. peppermint, chocolate, alcohol, coffee, citrus fruits and juices, tomoato products; avoid lying down for 2 to 3 hours after eating.  -Continue current medications  -Follow up with Dr Fish    Discharge disposition:  Home in

## 2025-02-05 NOTE — ANESTHESIA POSTPROCEDURE EVALUATION
Department of Anesthesiology  Postprocedure Note    Patient: Lindsay Maddox  MRN: [id number]  YOB: 1975  Date of evaluation: 2/5/2025    Procedure Summary       Date: 02/03/25 Room / Location: [facility] ENDO 01 / [facility] ENDOSCOPY    Anesthesia Start: 1058 Anesthesia Stop: 1106    Procedure: ESOPHAGOGASTRODUODENOSCOPY (Upper GI Region) Diagnosis:       Gastrointestinal hemorrhage, unspecified gastrointestinal hemorrhage type      Gastroesophageal reflux disease, unspecified whether esophagitis present      (Gastrointestinal hemorrhage, unspecified gastrointestinal hemorrhage type [K92.2])      (Gastroesophageal reflux disease, unspecified whether esophagitis present [K21.9])    Surgeons: Bubba Vale MD Responsible Provider: Carmine Alvarado MD    Anesthesia Type: MAC ASA Status: 3            Anesthesia Type: MAC    Viktoriya Phase I: Viktoriya Score: 10    Viktoriya Phase II: Viktoriya Score: 10    Anesthesia Post Evaluation    Patient location during evaluation: bedside  Nausea & Vomiting: no nausea  Cardiovascular status: blood pressure returned to baseline  Respiratory status: acceptable  Hydration status: euvolemic    No notable events documented.

## 2025-02-26 VITALS — BODY MASS INDEX: 51.91 KG/M2 | HEIGHT: 63 IN | WEIGHT: 293 LBS

## 2025-02-26 ASSESSMENT — SLEEP AND FATIGUE QUESTIONNAIRES
DO YOU HAVE DIFFICULTY BEING AS ACTIVE AS YOU WANT TO BE IN THE EVENING BECAUSE YOU ARE SLEEPY OR TIRED: NO
FOSQ SCORE: 19
HOW LIKELY ARE YOU TO NOD OFF OR FALL ASLEEP WHILE SITTING INACTIVE IN A PUBLIC PLACE: WOULD NEVER DOZE
HOW LIKELY ARE YOU TO NOD OFF OR FALL ASLEEP WHILE SITTING AND READING: WOULD NEVER DOZE
HOW LIKELY ARE YOU TO NOD OFF OR FALL ASLEEP WHILE SITTING QUIETLY AFTER LUNCH WITHOUT ALCOHOL: WOULD NEVER DOZE
DO YOU HAVE DIFFICULTY WATCHING A MOVIE OR VIDEO BECAUSE YOU BECOME SLEEPY OR TIRED: NO
HOW LIKELY ARE YOU TO NOD OFF OR FALL ASLEEP WHILE SITTING QUIETLY AFTER LUNCH WITHOUT ALCOHOL: WOULD NEVER DOZE
DO YOU HAVE DIFFICULTY BEING AS ACTIVE AS YOU WANT TO BE IN THE MORNING BECAUSE YOU ARE SLEEPY OR TIRED: YES, MODERATE
HOW LIKELY ARE YOU TO NOD OFF OR FALL ASLEEP WHEN YOU ARE A PASSENGER IN A CAR FOR AN HOUR WITHOUT A BREAK: WOULD NEVER DOZE
HOW LIKELY ARE YOU TO NOD OFF OR FALL ASLEEP WHILE WATCHING TV: WOULD NEVER DOZE
HOW LIKELY ARE YOU TO NOD OFF OR FALL ASLEEP WHILE LYING DOWN TO REST IN THE AFTERNOON WHEN CIRCUMSTANCES PERMIT: HIGH CHANCE OF DOZING
HAS YOUR MOOD BEEN AFFECTED BECAUSE YOU ARE SLEEPY OR TIRED: NO
HOW LIKELY ARE YOU TO NOD OFF OR FALL ASLEEP WHILE WATCHING TV: WOULD NEVER DOZE
HOW LIKELY ARE YOU TO NOD OFF OR FALL ASLEEP WHEN YOU ARE A PASSENGER IN A CAR FOR AN HOUR WITHOUT A BREAK: WOULD NEVER DOZE
HOW LIKELY ARE YOU TO NOD OFF OR FALL ASLEEP WHILE SITTING AND READING: WOULD NEVER DOZE
HOW LIKELY ARE YOU TO NOD OFF OR FALL ASLEEP WHEN YOU ARE A PASSENGER IN A CAR FOR AN HOUR WITHOUT A BREAK: WOULD NEVER DOZE
HOW LIKELY ARE YOU TO NOD OFF OR FALL ASLEEP WHILE SITTING INACTIVE IN A PUBLIC PLACE: WOULD NEVER DOZE
HOW LIKELY ARE YOU TO NOD OFF OR FALL ASLEEP WHILE LYING DOWN TO REST IN THE AFTERNOON WHEN CIRCUMSTANCES PERMIT: WOULD NEVER DOZE
DO YOU HAVE DIFFICULTY OPERATING A MOTOR VEHICLE FOR LONG DISTANCES (GREATER THAN 100 MILES) BECAUSE YOU BECOME SLEEPY: NO
HAS YOUR RELATIONSHIP WITH FAMILY, FRIENDS OR WORK COLLEAGUES BEEN AFFECTED BECAUSE YOU ARE SLEEPY OR TIRED: NO
HOW LIKELY ARE YOU TO NOD OFF OR FALL ASLEEP IN A CAR, WHILE STOPPED FOR A FEW MINUTES IN TRAFFIC: WOULD NEVER DOZE
ESS TOTAL SCORE: 3
HOW LIKELY ARE YOU TO NOD OFF OR FALL ASLEEP WHILE SITTING INACTIVE IN A PUBLIC PLACE: WOULD NEVER DOZE
HOW LIKELY ARE YOU TO NOD OFF OR FALL ASLEEP WHILE SITTING AND TALKING TO SOMEONE: WOULD NEVER DOZE
HOW LIKELY ARE YOU TO NOD OFF OR FALL ASLEEP WHILE SITTING AND TALKING TO SOMEONE: WOULD NEVER DOZE
HOW LIKELY ARE YOU TO NOD OFF OR FALL ASLEEP IN A CAR, WHILE STOPPED FOR A FEW MINUTES IN TRAFFIC: WOULD NEVER DOZE
HOW LIKELY ARE YOU TO NOD OFF OR FALL ASLEEP WHILE LYING DOWN TO REST IN THE AFTERNOON WHEN CIRCUMSTANCES PERMIT: WOULD NEVER DOZE
DO YOU HAVE DIFFICULTY VISITING YOUR FAMILY OR FRIENDS IN THEIR HOME BECAUSE YOU BECOME SLEEPY OR TIRED: NO
DO YOU HAVE DIFFICULTY OPERATING A MOTOR VEHICLE FOR SHORT DISTANCES (LESS THAN 100 MILES) BECAUSE YOU BECOME SLEEPY: NO
DO YOU HAVE DIFFICULTY CONCENTRATING ON THE THINGS YOU DO BECAUSE YOU ARE SLEEPY OR TIRED: NO
HOW LIKELY ARE YOU TO NOD OFF OR FALL ASLEEP IN A CAR, WHILE STOPPED FOR A FEW MINUTES IN TRAFFIC: WOULD NEVER DOZE
ESS TOTAL SCORE: 0
DO YOU GENERALLY HAVE DIFFICULTY REMEMBERING THINGS BECAUSE YOU ARE SLEEPY OR TIRED: NO
HOW LIKELY ARE YOU TO NOD OFF OR FALL ASLEEP WHILE SITTING QUIETLY AFTER LUNCH WITHOUT ALCOHOL: WOULD NEVER DOZE
HOW LIKELY ARE YOU TO NOD OFF OR FALL ASLEEP WHILE SITTING AND TALKING TO SOMEONE: WOULD NEVER DOZE
HOW LIKELY ARE YOU TO NOD OFF OR FALL ASLEEP WHILE WATCHING TV: WOULD NEVER DOZE
HOW LIKELY ARE YOU TO NOD OFF OR FALL ASLEEP WHILE SITTING AND READING: WOULD NEVER DOZE

## 2025-02-27 ENCOUNTER — TELEMEDICINE (OUTPATIENT)
Age: 50
End: 2025-02-27
Payer: COMMERCIAL

## 2025-02-27 DIAGNOSIS — G47.33 OSA (OBSTRUCTIVE SLEEP APNEA): Primary | ICD-10-CM

## 2025-02-27 DIAGNOSIS — G43.909 MIGRAINE WITHOUT STATUS MIGRAINOSUS, NOT INTRACTABLE, UNSPECIFIED MIGRAINE TYPE: ICD-10-CM

## 2025-02-27 DIAGNOSIS — I10 PRIMARY HYPERTENSION: ICD-10-CM

## 2025-02-27 PROCEDURE — 99213 OFFICE O/P EST LOW 20 MIN: CPT | Performed by: NURSE PRACTITIONER

## 2025-02-27 NOTE — PROGRESS NOTES
[address]   [place] [address]   Tel.  [phone]   Fax. [phone]  [address]   [place] [address]   Tel.  [phone]   Fax. [phone] [address] [place] [address]   Tel.  [phone]   Fax. [phone]     Lindsay Maddox (: 1975) is a 48 y.o. female, established patient, seen for positive airway pressure follow-up, she was last seen by me on 2024, prior notes reviewed in detail.  Novasom home sleep test showed AHI of 6.9/hr. She was started on PAP therapy. A titration study  showed She is seen today for follow up.      ASSESSMENT/PLAN:   Diagnosis Orders   1. BRANDEE (obstructive sleep apnea)        2. Primary hypertension        3. BMI 50.0-59.9, adult          AHI = 6.9 .  On CPAP :  5-10 cmH2O. Set up .     She is not compliant with PAP therapy although when used PAP shows benefit to the patient and remains necessary for control of her sleep apnea.       No follow-up provider specified.    Sleep Apnea -  She has not used her machine in a number of months. She actually returned it to her supplier. She notes the machine stopped working properly and she tried to get help from the supplier but unfortunately was left with the machine inoperable. She returned the device to the supplier. She works night shift from 7pm-3am and notes that she sleeps all day. If she can time her sleep after using the restroom and taking her medication she will sleep all the way through the day no problem. She does not feel that her sleep is impacted by BRANDEE. She feels her primary complaint is insomnia (mostly due to working night shift). She actually feels that she is sleeping better without PAP. 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 Once she has obtained a day time schedule, she will reach back out to our office. We may look to restart PAP therapy.     *  Counseling was provided regarding the importance of regular PAP

## 2025-02-27 NOTE — PATIENT INSTRUCTIONS
[address]  [place] [address]  Tel.  [phone]  Fax. [phone] [address]  [place] [address]  Tel.  [phone]  Fax. [phone] [address] [place] [address]  Tel.  [phone]  Fax. [phone]     PROPER SLEEP HYGIENE    What to avoid  Do not have drinks with caffeine, such as coffee or black tea, for 8 hours before bed.  Do not smoke or use other types of tobacco near bedtime. Nicotine is a stimulant and can keep you awake.  Avoid drinking alcohol late in the evening, because it can cause you to wake in the middle of the night.  Do not eat a big meal close to bedtime. If you are hungry, eat a light snack.  Do not drink a lot of water close to bedtime, because the need to urinate may wake you up during the night.  Do not read or watch TV in bed. Use the bed only for sleeping and sexual activity.  What to try  Go to bed at the same time every night, and wake up at the same time every morning. Do not take naps during the day.  Keep your bedroom quiet, dark, and cool.  Get regular exercise, but not within 3 to 4 hours of your bedtime..  Sleep on a comfortable pillow and mattress.  If watching the clock makes you anxious, turn it facing away from you so you cannot see the time.  If you worry when you lie down, start a worry book. Well before bedtime, write down your worries, and then set the book and your concerns aside.  Try meditation or other relaxation techniques before you go to bed.  If you cannot fall asleep, get up and go to another room until you feel sleepy. Do something relaxing. Repeat your bedtime routine before you go to bed again.  Make your house quiet and calm about an hour before bedtime. Turn down the lights, turn off the TV, log off the computer, and turn down the volume on music. This can help you relax after a busy day.    Drowsy Driving  The [place] National Highway Traffic Safety Administration cites drowsiness as a

## 2025-03-17 ENCOUNTER — HOSPITAL ENCOUNTER (EMERGENCY)
Facility: HOSPITAL | Age: 50
Discharge: HOME | End: 2025-03-17
Attending: STUDENT IN AN ORGANIZED HEALTH CARE EDUCATION/TRAINING PROGRAM
Payer: COMMERCIAL

## 2025-03-17 VITALS
RESPIRATION RATE: 17 BRPM | BODY MASS INDEX: 51.91 KG/M2 | WEIGHT: 293 LBS | HEART RATE: 88 BPM | DIASTOLIC BLOOD PRESSURE: 72 MMHG | OXYGEN SATURATION: 100 % | TEMPERATURE: 97.5 F | SYSTOLIC BLOOD PRESSURE: 120 MMHG | HEIGHT: 63 IN

## 2025-03-17 DIAGNOSIS — S43.402A SPRAIN OF LEFT SHOULDER, UNSPECIFIED SHOULDER SPRAIN TYPE, INITIAL ENCOUNTER: Primary | ICD-10-CM

## 2025-03-17 LAB
EKG ATRIAL RATE: 83 BPM
EKG DIAGNOSIS: NORMAL
EKG P AXIS: 53 DEGREES
EKG P-R INTERVAL: 132 MS
EKG Q-T INTERVAL: 388 MS
EKG QRS DURATION: 72 MS
EKG QTC CALCULATION (BAZETT): 455 MS
EKG R AXIS: 44 DEGREES
EKG T AXIS: 23 DEGREES
EKG VENTRICULAR RATE: 83 BPM

## 2025-03-17 PROCEDURE — 93005 ELECTROCARDIOGRAM TRACING: CPT | Performed by: STUDENT IN AN ORGANIZED HEALTH CARE EDUCATION/TRAINING PROGRAM

## 2025-03-17 PROCEDURE — 96372 THER/PROPH/DIAG INJ SC/IM: CPT

## 2025-03-17 PROCEDURE — 99284 EMERGENCY DEPT VISIT MOD MDM: CPT

## 2025-03-17 PROCEDURE — 6360000002 HC RX W HCPCS: Performed by: STUDENT IN AN ORGANIZED HEALTH CARE EDUCATION/TRAINING PROGRAM

## 2025-03-17 RX ORDER — KETOROLAC TROMETHAMINE 30 MG/ML
30 INJECTION, SOLUTION INTRAMUSCULAR; INTRAVENOUS
Status: COMPLETED | OUTPATIENT
Start: 2025-03-17 | End: 2025-03-17

## 2025-03-17 RX ORDER — KETOROLAC TROMETHAMINE 10 MG/1
10 TABLET, FILM COATED ORAL EVERY 6 HOURS PRN
Qty: 20 TABLET | Refills: 0 | Status: SHIPPED | OUTPATIENT
Start: 2025-03-17 | End: 2025-03-22

## 2025-03-17 RX ADMIN — KETOROLAC TROMETHAMINE 30 MG: 30 INJECTION, SOLUTION INTRAMUSCULAR at 02:46

## 2025-03-17 ASSESSMENT — PAIN SCALES - GENERAL: PAINLEVEL_OUTOF10: 7

## 2025-03-17 ASSESSMENT — LIFESTYLE VARIABLES
HOW MANY STANDARD DRINKS CONTAINING ALCOHOL DO YOU HAVE ON A TYPICAL DAY: PATIENT DOES NOT DRINK
HOW OFTEN DO YOU HAVE A DRINK CONTAINING ALCOHOL: NEVER

## 2025-03-17 NOTE — ED PROVIDER NOTES
Record. Lonnie Cage MD (electronically signed)    (Please note that parts of this dictation were completed with voice recognition software. Quite often unanticipated grammatical, syntax, homophones, and other interpretive errors are inadvertently transcribed by the computer software. Please disregards these errors. Please excuse any errors that have escaped final proofreading.)       Lonnie Cage MD  03/17/25 0254

## 2025-03-17 NOTE — ED TRIAGE NOTES
C/o pain in l shoulder to l upper arm. Hx of brusitis. Denies chest pain, sob n/v/d  Started today

## 2025-04-18 ENCOUNTER — OFFICE VISIT (OUTPATIENT)
Facility: CLINIC | Age: 50
End: 2025-04-18
Payer: COMMERCIAL

## 2025-04-18 VITALS
SYSTOLIC BLOOD PRESSURE: 124 MMHG | TEMPERATURE: 97.9 F | OXYGEN SATURATION: 98 % | HEIGHT: 63 IN | BODY MASS INDEX: 51.91 KG/M2 | DIASTOLIC BLOOD PRESSURE: 86 MMHG | WEIGHT: 293 LBS | RESPIRATION RATE: 18 BRPM | HEART RATE: 80 BPM

## 2025-04-18 DIAGNOSIS — B37.2 CANDIDAL SKIN INFECTION: Primary | ICD-10-CM

## 2025-04-18 DIAGNOSIS — L29.9 PRURITUS: ICD-10-CM

## 2025-04-18 PROCEDURE — 99204 OFFICE O/P NEW MOD 45 MIN: CPT | Performed by: NURSE PRACTITIONER

## 2025-04-18 PROCEDURE — 3079F DIAST BP 80-89 MM HG: CPT | Performed by: NURSE PRACTITIONER

## 2025-04-18 PROCEDURE — 3074F SYST BP LT 130 MM HG: CPT | Performed by: NURSE PRACTITIONER

## 2025-04-18 RX ORDER — NYSTATIN 100000 [USP'U]/G
POWDER TOPICAL 4 TIMES DAILY
Qty: 60 G | Refills: 1 | Status: SHIPPED | OUTPATIENT
Start: 2025-04-18

## 2025-04-18 RX ORDER — FLUCONAZOLE 100 MG/1
200 TABLET ORAL DAILY
Qty: 14 TABLET | Refills: 0 | Status: SHIPPED | OUTPATIENT
Start: 2025-04-18 | End: 2025-04-25

## 2025-04-18 RX ORDER — HYDROXYZINE HYDROCHLORIDE 25 MG/1
25 TABLET, FILM COATED ORAL EVERY 6 HOURS PRN
Qty: 40 TABLET | Refills: 0 | Status: SHIPPED | OUTPATIENT
Start: 2025-04-18 | End: 2025-04-28

## 2025-04-18 SDOH — ECONOMIC STABILITY: FOOD INSECURITY: WITHIN THE PAST 12 MONTHS, THE FOOD YOU BOUGHT JUST DIDN'T LAST AND YOU DIDN'T HAVE MONEY TO GET MORE.: SOMETIMES TRUE

## 2025-04-18 SDOH — ECONOMIC STABILITY: FOOD INSECURITY: WITHIN THE PAST 12 MONTHS, YOU WORRIED THAT YOUR FOOD WOULD RUN OUT BEFORE YOU GOT MONEY TO BUY MORE.: SOMETIMES TRUE

## 2025-04-18 ASSESSMENT — PATIENT HEALTH QUESTIONNAIRE - PHQ9
4. FEELING TIRED OR HAVING LITTLE ENERGY: NOT AT ALL
SUM OF ALL RESPONSES TO PHQ QUESTIONS 1-9: 0
10. IF YOU CHECKED OFF ANY PROBLEMS, HOW DIFFICULT HAVE THESE PROBLEMS MADE IT FOR YOU TO DO YOUR WORK, TAKE CARE OF THINGS AT HOME, OR GET ALONG WITH OTHER PEOPLE: NOT DIFFICULT AT ALL
9. THOUGHTS THAT YOU WOULD BE BETTER OFF DEAD, OR OF HURTING YOURSELF: NOT AT ALL
5. POOR APPETITE OR OVEREATING: NOT AT ALL
3. TROUBLE FALLING OR STAYING ASLEEP: NOT AT ALL
7. TROUBLE CONCENTRATING ON THINGS, SUCH AS READING THE NEWSPAPER OR WATCHING TELEVISION: NOT AT ALL
SUM OF ALL RESPONSES TO PHQ QUESTIONS 1-9: 0
SUM OF ALL RESPONSES TO PHQ QUESTIONS 1-9: 0
1. LITTLE INTEREST OR PLEASURE IN DOING THINGS: NOT AT ALL
SUM OF ALL RESPONSES TO PHQ QUESTIONS 1-9: 0
2. FEELING DOWN, DEPRESSED OR HOPELESS: NOT AT ALL
8. MOVING OR SPEAKING SO SLOWLY THAT OTHER PEOPLE COULD HAVE NOTICED. OR THE OPPOSITE, BEING SO FIGETY OR RESTLESS THAT YOU HAVE BEEN MOVING AROUND A LOT MORE THAN USUAL: NOT AT ALL
6. FEELING BAD ABOUT YOURSELF - OR THAT YOU ARE A FAILURE OR HAVE LET YOURSELF OR YOUR FAMILY DOWN: NOT AT ALL

## 2025-04-18 NOTE — PROGRESS NOTES
Chief Complaint   Patient presents with    Rash     Under breast           /86 (BP Site: Left Upper Arm, Patient Position: Sitting)   Pulse 80   Temp 97.9 °F (36.6 °C) (Oral)   Resp 18   Ht 1.6 m (5' 3\")   Wt (!) 145.9 kg (321 lb 9.6 oz)   SpO2 98%   BMI 56.97 kg/m²         \"Have you been to the ER, urgent care clinic since your last visit?  Hospitalized since your last visit?\"    NO    “Have you seen or consulted any other health care providers outside our system since your last visit?”    NO             
No tenderness.      Right lower leg: No edema.      Left lower leg: No edema.   Lymphadenopathy:      Cervical: No cervical adenopathy.   Skin:     General: Skin is warm and dry.      Findings: Rash (inframammary fold rash) present. No bruising or lesion.   Neurological:      Mental Status: She is alert.      Cranial Nerves: No cranial nerve deficit.      Sensory: No sensory deficit.      Motor: No weakness.      Coordination: Coordination normal.      Gait: Gait normal.      Deep Tendon Reflexes: Reflexes normal.   Psychiatric:         Mood and Affect: Mood normal.         Behavior: Behavior normal.          Assessment & Plan  Assessment & Plan  Candidal skin infection   Chronic, worsening (exacerbation), changes made today: topical and oral antifungal, medication adherence emphasized, and lifestyle modifications recommended    Orders:    nystatin (MYCOSTATIN) 253131 UNIT/GM powder; Apply topically 4 times daily    fluconazole (DIFLUCAN) 100 MG tablet; Take 2 tablets by mouth daily for 7 days    Pruritus   Chronic, worsening (exacerbation), changes made today: topical and oral antifungal, medication adherence emphasized, and lifestyle modifications recommended    Orders:    hydrOXYzine HCl (ATARAX) 25 MG tablet; Take 1 tablet by mouth every 6 hours as needed for Itching        Aspects of this note have been generated using voice recognition software. Despite editing, there may be some syntax errors.      MIRNA Apple - NP

## 2025-04-18 NOTE — ASSESSMENT & PLAN NOTE
Chronic, worsening (exacerbation), changes made today: topical and oral antifungal, medication adherence emphasized, and lifestyle modifications recommended    Orders:    hydrOXYzine HCl (ATARAX) 25 MG tablet; Take 1 tablet by mouth every 6 hours as needed for Itching

## 2025-04-21 ASSESSMENT — ENCOUNTER SYMPTOMS
COLOR CHANGE: 0
EYE REDNESS: 0
TROUBLE SWALLOWING: 0
SORE THROAT: 0
NAUSEA: 0
CHEST TIGHTNESS: 0
WHEEZING: 0
ABDOMINAL PAIN: 0
VOMITING: 0
COUGH: 0
EYE PAIN: 0
EYE DISCHARGE: 0
SHORTNESS OF BREATH: 0

## 2025-05-02 DIAGNOSIS — I10 BENIGN ESSENTIAL HYPERTENSION: ICD-10-CM

## 2025-05-04 RX ORDER — AMLODIPINE BESYLATE 10 MG/1
10 TABLET ORAL DAILY
Qty: 90 TABLET | Refills: 1 | Status: SHIPPED | OUTPATIENT
Start: 2025-05-04

## 2025-05-13 LAB
CHOLEST SERPL-MCNC: 149 MG/DL
GLUCOSE SERPL-MCNC: 99 MG/DL (ref 65–100)
HDLC SERPL-MCNC: 47 MG/DL
LDLC SERPL CALC-MCNC: 84.8 MG/DL (ref 0–100)
TRIGL SERPL-MCNC: 86 MG/DL

## 2025-05-21 ENCOUNTER — CLINICAL DOCUMENTATION (OUTPATIENT)
Dept: PHARMACY | Facility: CLINIC | Age: 50
End: 2025-05-21

## 2025-05-21 NOTE — PROGRESS NOTES
**Patient is [facility]**  Pharmacy Pop Care Documentation:   Patient is missing the following requirements: DX: DM Type One or Type Two; Provider Documentation of DM Visit.  If completed patient will be eligible for enrollment in the DM Program.     Application Received: via [employer].     Following e-mail sent to patient at [email or web address] - e-sylvia provided on the application:     Thanks so much for taking the first step towards better health.     This e-mail is to inform you that we have received your enrollment form for the [facility] Be Well With Diabetes Program, but you are missing the following requirements or documentation:     Diagnosis of Diabetes Type One or Two - as per documentation from your Provider  2.   Provider Visit for Diabetes within the last 12 months     To qualify for this program the above requirements must be met to complete your enrollment into the program.  Results or visits obtained outside of [facility] will need to be provided by fax: [phone] or email: [email or web address] to qualify for the program.     If requirements are not met, you will be not be enrolled in the program.        [facility]  Phone: toll free [phone] Option #3  Email: [email or web address]  Fax Number: [phone]     Blossom Shankar CphT  [facility]  Clinical Pharmacy   Department, toll free: [phone] Option #3

## (undated) DEVICE — TUBING, SUCTION, 9/32" X 10', STRAIGHT: Brand: MEDLINE

## (undated) DEVICE — 1200CC GUARDIAN II: Brand: GUARDIAN

## (undated) DEVICE — GLOVE ORANGE PI 7 1/2   MSG9075

## (undated) DEVICE — TUBING IRRIG COMPATIBLE W ERBE MEDIVATOR PMP HYDR

## (undated) DEVICE — SPONGE GZ W4XL4IN COT 12 PLY TYP VII WVN C FLD DSGN STERILE

## (undated) DEVICE — JELLY,LUBE,STERILE,FLIP TOP,TUBE,4-OZ: Brand: MEDLINE

## (undated) DEVICE — LINE SAMPLING ADVANCE ORAL NASAL MICROSTREAM O2 TUBING 6.5'

## (undated) DEVICE — FORCEPS BX L240CM JAW DIA2.4MM ORNG L CAP W/ NDL DISP RAD

## (undated) DEVICE — MASK POM PROCEDURE OXY W/ HI CONCENTRATION CO2 MONITOR

## (undated) DEVICE — SOLUTION IRRIG 1000ML STRL H2O USP PLAS POUR BTL

## (undated) DEVICE — PAD,PREPPING,CUFFED,24X48,7",NONSTERILE: Brand: MEDLINE

## (undated) DEVICE — CONTAINER SPEC 20ML NEUT BUFF FRMLN PREFIL STATLAB